# Patient Record
Sex: FEMALE | Race: WHITE | Employment: OTHER | URBAN - METROPOLITAN AREA
[De-identification: names, ages, dates, MRNs, and addresses within clinical notes are randomized per-mention and may not be internally consistent; named-entity substitution may affect disease eponyms.]

---

## 2017-02-16 ENCOUNTER — HOSPITAL ENCOUNTER (OUTPATIENT)
Age: 72
Setting detail: SPECIMEN
Discharge: HOME | End: 2017-02-16
Payer: MEDICARE

## 2017-02-16 LAB
ABSOLUTE EOS #: 0 K/UL (ref 0–0.4)
ABSOLUTE LYMPH #: 2.8 K/UL (ref 1–4.8)
ABSOLUTE MONO #: 0.6 K/UL (ref 0.1–1.2)
BASOPHILS # BLD: 1 % (ref 0–2)
BASOPHILS ABSOLUTE: 0 K/UL (ref 0–0.2)
DIFFERENTIAL TYPE: NORMAL
EOSINOPHILS RELATIVE PERCENT: 1 % (ref 1–4)
HCT VFR BLD CALC: 44.7 % (ref 36–46)
HEMOGLOBIN: 15.4 G/DL (ref 12–16)
LYMPHOCYTES # BLD: 42 % (ref 24–44)
MCH RBC QN AUTO: 29.8 PG (ref 26–34)
MCHC RBC AUTO-ENTMCNC: 34.5 G/DL (ref 31–37)
MCV RBC AUTO: 86.4 FL (ref 80–100)
MONOCYTES # BLD: 9 % (ref 2–11)
PDW BLD-RTO: 12.5 % (ref 12.5–15.4)
PLATELET # BLD: 261 K/UL (ref 140–450)
PLATELET ESTIMATE: NORMAL
PMV BLD AUTO: 9.4 FL (ref 6–12)
RBC # BLD: 5.18 M/UL (ref 4–5.2)
RBC # BLD: NORMAL 10*6/UL
SEG NEUTROPHILS: 47 % (ref 36–66)
SEGMENTED NEUTROPHILS ABSOLUTE COUNT: 3.1 K/UL (ref 1.8–7.7)
T. PALLIDUM, IGG: NONREACTIVE
T3 FREE: 2.81 PG/ML (ref 2.02–4.43)
THYROXINE, FREE: 1.32 NG/DL (ref 0.93–1.7)
TSH SERPL DL<=0.05 MIU/L-ACNC: 2.51 MIU/L (ref 0.3–5)
VITAMIN B-12: 315 PG/ML (ref 211–946)
WBC # BLD: 6.6 K/UL (ref 3.5–11)
WBC # BLD: NORMAL 10*3/UL

## 2017-02-17 LAB
ALBUMIN (CALCULATED): 4.4 G/DL (ref 3.2–5.2)
ALBUMIN PERCENT: 67 % (ref 45–65)
ALPHA 1 PERCENT: 3 % (ref 3–6)
ALPHA 2 PERCENT: 11 % (ref 6–13)
ALPHA-1-GLOBULIN: 0.2 G/DL (ref 0.1–0.4)
ALPHA-2-GLOBULIN: 0.7 G/DL (ref 0.5–0.9)
BETA GLOBULIN: 0.7 G/DL (ref 0.7–1.4)
BETA PERCENT: 11 % (ref 11–19)
CRYPTOCOCCAL ANTIGEN: NEGATIVE
GAMMA GLOBULIN %: 8 % (ref 9–20)
GAMMA GLOBULIN: 0.5 G/DL (ref 0.5–1.5)
PATHOLOGIST: ABNORMAL
PROTEIN ELECTROPHORESIS, SERUM: ABNORMAL
TOTAL PROT. SUM,%: 100 % (ref 98–102)
TOTAL PROT. SUM: 6.5 G/DL (ref 6.3–8.2)
TOTAL PROTEIN: 6.6 G/DL (ref 6.4–8.3)

## 2017-02-18 LAB — VITAMIN D 1,25-DIHYDROXY: 58.8 PG/ML (ref 19.9–79.3)

## 2017-02-19 LAB — METHYLMALONIC ACID: 0.14 UMOL/L (ref 0–0.4)

## 2017-05-15 ENCOUNTER — HOSPITAL ENCOUNTER (OUTPATIENT)
Age: 72
Discharge: HOME | End: 2017-05-15
Payer: MEDICARE

## 2017-05-15 PROCEDURE — 81401 MOPATH PROCEDURE LEVEL 2: CPT

## 2017-05-15 PROCEDURE — 36415 COLL VENOUS BLD VENIPUNCTURE: CPT

## 2017-06-08 LAB
SEND OUT REPORT: NORMAL
TEST NAME: NORMAL

## 2017-06-19 ENCOUNTER — APPOINTMENT (OUTPATIENT)
Dept: CT IMAGING | Age: 72
End: 2017-06-19
Payer: MEDICARE

## 2017-06-19 ENCOUNTER — HOSPITAL ENCOUNTER (EMERGENCY)
Age: 72
Discharge: HOME | End: 2017-06-19
Attending: EMERGENCY MEDICINE
Payer: MEDICARE

## 2017-06-19 VITALS
HEIGHT: 65 IN | HEART RATE: 81 BPM | OXYGEN SATURATION: 99 % | RESPIRATION RATE: 13 BRPM | BODY MASS INDEX: 22.99 KG/M2 | DIASTOLIC BLOOD PRESSURE: 76 MMHG | WEIGHT: 138 LBS | SYSTOLIC BLOOD PRESSURE: 146 MMHG | TEMPERATURE: 97.1 F

## 2017-06-19 DIAGNOSIS — R25.2 JERKING MOVEMENTS OF EXTREMITIES: Primary | ICD-10-CM

## 2017-06-19 LAB
ABSOLUTE EOS #: 0 K/UL (ref 0–0.4)
ABSOLUTE LYMPH #: 1.5 K/UL (ref 1–4.8)
ABSOLUTE MONO #: 0.5 K/UL (ref 0.1–1.2)
ACETAMINOPHEN LEVEL: <10 UG/ML (ref 10–30)
ALBUMIN SERPL-MCNC: 4 G/DL (ref 3.5–5.2)
ALBUMIN/GLOBULIN RATIO: 1.6 (ref 1–2.5)
ALP BLD-CCNC: 48 U/L (ref 35–104)
ALT SERPL-CCNC: 11 U/L (ref 5–33)
ANION GAP SERPL CALCULATED.3IONS-SCNC: 15 MMOL/L (ref 9–17)
AST SERPL-CCNC: 19 U/L
BASOPHILS # BLD: 1 %
BASOPHILS ABSOLUTE: 0.1 K/UL (ref 0–0.2)
BILIRUB SERPL-MCNC: 0.58 MG/DL (ref 0.3–1.2)
BILIRUBIN DIRECT: 0.11 MG/DL
BILIRUBIN, INDIRECT: 0.47 MG/DL (ref 0–1)
BUN BLDV-MCNC: 13 MG/DL (ref 8–23)
BUN/CREAT BLD: ABNORMAL (ref 9–20)
C-REACTIVE PROTEIN: <0.3 MG/L (ref 0–5)
CALCIUM SERPL-MCNC: 9.2 MG/DL (ref 8.6–10.4)
CHLORIDE BLD-SCNC: 105 MMOL/L (ref 98–107)
CO2: 20 MMOL/L (ref 20–31)
CREAT SERPL-MCNC: 0.59 MG/DL (ref 0.5–0.9)
DIFFERENTIAL TYPE: NORMAL
EOSINOPHILS RELATIVE PERCENT: 0 %
ETHANOL PERCENT: <0.01 %
ETHANOL: <10 MG/DL
GFR AFRICAN AMERICAN: >60 ML/MIN
GFR NON-AFRICAN AMERICAN: >60 ML/MIN
GFR SERPL CREATININE-BSD FRML MDRD: ABNORMAL ML/MIN/{1.73_M2}
GFR SERPL CREATININE-BSD FRML MDRD: ABNORMAL ML/MIN/{1.73_M2}
GLOBULIN: NORMAL G/DL (ref 1.5–3.8)
GLUCOSE BLD-MCNC: 123 MG/DL (ref 70–99)
HCT VFR BLD CALC: 41 % (ref 36–46)
HEMOGLOBIN: 14.6 G/DL (ref 12–16)
LYMPHOCYTES # BLD: 29 %
MCH RBC QN AUTO: 30.4 PG (ref 26–34)
MCHC RBC AUTO-ENTMCNC: 35.5 G/DL (ref 31–37)
MCV RBC AUTO: 85.7 FL (ref 80–100)
MONOCYTES # BLD: 10 %
PDW BLD-RTO: 12.6 % (ref 12.5–15.4)
PLATELET # BLD: 230 K/UL (ref 140–450)
PLATELET ESTIMATE: NORMAL
PMV BLD AUTO: 8.5 FL (ref 6–12)
POTASSIUM SERPL-SCNC: 4 MMOL/L (ref 3.7–5.3)
RBC # BLD: 4.79 M/UL (ref 4–5.2)
RBC # BLD: NORMAL 10*6/UL
SALICYLATE LEVEL: <1 MG/DL (ref 3–10)
SEG NEUTROPHILS: 60 %
SEGMENTED NEUTROPHILS ABSOLUTE COUNT: 3.1 K/UL (ref 1.8–7.7)
SODIUM BLD-SCNC: 140 MMOL/L (ref 135–144)
TOTAL PROTEIN: 6.5 G/DL (ref 6.4–8.3)
TOXIC TRICYCLIC SC,BLOOD: NEGATIVE
WBC # BLD: 5.1 K/UL (ref 3.5–11)
WBC # BLD: NORMAL 10*3/UL

## 2017-06-19 PROCEDURE — 2580000003 HC RX 258: Performed by: EMERGENCY MEDICINE

## 2017-06-19 PROCEDURE — 86140 C-REACTIVE PROTEIN: CPT

## 2017-06-19 PROCEDURE — 80307 DRUG TEST PRSMV CHEM ANLYZR: CPT

## 2017-06-19 PROCEDURE — 85025 COMPLETE CBC W/AUTO DIFF WBC: CPT

## 2017-06-19 PROCEDURE — 70450 CT HEAD/BRAIN W/O DYE: CPT

## 2017-06-19 PROCEDURE — G0480 DRUG TEST DEF 1-7 CLASSES: HCPCS

## 2017-06-19 PROCEDURE — 80076 HEPATIC FUNCTION PANEL: CPT

## 2017-06-19 PROCEDURE — 99284 EMERGENCY DEPT VISIT MOD MDM: CPT

## 2017-06-19 PROCEDURE — 80048 BASIC METABOLIC PNL TOTAL CA: CPT

## 2017-06-19 RX ORDER — 0.9 % SODIUM CHLORIDE 0.9 %
1000 INTRAVENOUS SOLUTION INTRAVENOUS ONCE
Status: COMPLETED | OUTPATIENT
Start: 2017-06-19 | End: 2017-06-19

## 2017-06-19 RX ADMIN — SODIUM CHLORIDE 1000 ML: 9 INJECTION, SOLUTION INTRAVENOUS at 07:20

## 2017-06-19 ASSESSMENT — ENCOUNTER SYMPTOMS
DIARRHEA: 0
VOMITING: 0
CHEST TIGHTNESS: 0
NAUSEA: 0

## 2017-06-26 ENCOUNTER — TELEPHONE (OUTPATIENT)
Dept: NEUROLOGY | Age: 72
End: 2017-06-26

## 2017-06-27 ENCOUNTER — TELEPHONE (OUTPATIENT)
Dept: NEUROLOGY | Age: 72
End: 2017-06-27

## 2019-03-07 ENCOUNTER — HOSPITAL ENCOUNTER (OUTPATIENT)
Age: 74
Setting detail: OBSERVATION
Discharge: HOME | End: 2019-03-08
Attending: EMERGENCY MEDICINE | Admitting: EMERGENCY MEDICINE
Payer: COMMERCIAL

## 2019-03-07 ENCOUNTER — APPOINTMENT (OUTPATIENT)
Dept: GENERAL RADIOLOGY | Age: 74
End: 2019-03-07
Payer: COMMERCIAL

## 2019-03-07 DIAGNOSIS — R07.9 CHEST PAIN, UNSPECIFIED TYPE: Primary | ICD-10-CM

## 2019-03-07 LAB
ABSOLUTE EOS #: <0.03 K/UL (ref 0–0.44)
ABSOLUTE IMMATURE GRANULOCYTE: <0.03 K/UL (ref 0–0.3)
ABSOLUTE LYMPH #: 2 K/UL (ref 1.1–3.7)
ABSOLUTE MONO #: 0.61 K/UL (ref 0.1–1.2)
ANION GAP SERPL CALCULATED.3IONS-SCNC: 12 MMOL/L (ref 9–17)
BASOPHILS # BLD: 1 % (ref 0–2)
BASOPHILS ABSOLUTE: 0.03 K/UL (ref 0–0.2)
BUN BLDV-MCNC: 16 MG/DL (ref 8–23)
BUN/CREAT BLD: ABNORMAL (ref 9–20)
CALCIUM SERPL-MCNC: 9.5 MG/DL (ref 8.6–10.4)
CHLORIDE BLD-SCNC: 108 MMOL/L (ref 98–107)
CO2: 21 MMOL/L (ref 20–31)
CREAT SERPL-MCNC: 0.58 MG/DL (ref 0.5–0.9)
DIFFERENTIAL TYPE: ABNORMAL
EOSINOPHILS RELATIVE PERCENT: 0 % (ref 1–4)
GFR AFRICAN AMERICAN: >60 ML/MIN
GFR NON-AFRICAN AMERICAN: >60 ML/MIN
GFR SERPL CREATININE-BSD FRML MDRD: ABNORMAL ML/MIN/{1.73_M2}
GFR SERPL CREATININE-BSD FRML MDRD: ABNORMAL ML/MIN/{1.73_M2}
GLUCOSE BLD-MCNC: 100 MG/DL (ref 70–99)
HCT VFR BLD CALC: 43.6 % (ref 36.3–47.1)
HEMOGLOBIN: 14.8 G/DL (ref 11.9–15.1)
IMMATURE GRANULOCYTES: 0 %
LYMPHOCYTES # BLD: 35 % (ref 24–43)
MCH RBC QN AUTO: 31 PG (ref 25.2–33.5)
MCHC RBC AUTO-ENTMCNC: 33.9 G/DL (ref 28.4–34.8)
MCV RBC AUTO: 91.4 FL (ref 82.6–102.9)
MONOCYTES # BLD: 11 % (ref 3–12)
NRBC AUTOMATED: 0 PER 100 WBC
PDW BLD-RTO: 11.8 % (ref 11.8–14.4)
PLATELET # BLD: 273 K/UL (ref 138–453)
PLATELET ESTIMATE: ABNORMAL
PMV BLD AUTO: 10.3 FL (ref 8.1–13.5)
POTASSIUM SERPL-SCNC: 3.6 MMOL/L (ref 3.7–5.3)
RBC # BLD: 4.77 M/UL (ref 3.95–5.11)
RBC # BLD: ABNORMAL 10*6/UL
SEG NEUTROPHILS: 53 % (ref 36–65)
SEGMENTED NEUTROPHILS ABSOLUTE COUNT: 3.11 K/UL (ref 1.5–8.1)
SODIUM BLD-SCNC: 141 MMOL/L (ref 135–144)
TROPONIN INTERP: NORMAL
TROPONIN T: NORMAL NG/ML
TROPONIN, HIGH SENSITIVITY: <6 NG/L (ref 0–14)
WBC # BLD: 5.8 K/UL (ref 3.5–11.3)
WBC # BLD: ABNORMAL 10*3/UL

## 2019-03-07 PROCEDURE — 93005 ELECTROCARDIOGRAM TRACING: CPT

## 2019-03-07 PROCEDURE — 2580000003 HC RX 258: Performed by: EMERGENCY MEDICINE

## 2019-03-07 PROCEDURE — 99285 EMERGENCY DEPT VISIT HI MDM: CPT

## 2019-03-07 PROCEDURE — 71046 X-RAY EXAM CHEST 2 VIEWS: CPT

## 2019-03-07 PROCEDURE — 85025 COMPLETE CBC W/AUTO DIFF WBC: CPT

## 2019-03-07 PROCEDURE — G0378 HOSPITAL OBSERVATION PER HR: HCPCS

## 2019-03-07 PROCEDURE — 6370000000 HC RX 637 (ALT 250 FOR IP): Performed by: EMERGENCY MEDICINE

## 2019-03-07 PROCEDURE — 80048 BASIC METABOLIC PNL TOTAL CA: CPT

## 2019-03-07 PROCEDURE — 84484 ASSAY OF TROPONIN QUANT: CPT

## 2019-03-07 RX ORDER — RIVASTIGMINE 9.5 MG/24H
1 PATCH, EXTENDED RELEASE TRANSDERMAL DAILY
Status: DISCONTINUED | OUTPATIENT
Start: 2019-03-07 | End: 2019-03-08 | Stop reason: HOSPADM

## 2019-03-07 RX ORDER — SODIUM CHLORIDE 0.9 % (FLUSH) 0.9 %
10 SYRINGE (ML) INJECTION EVERY 12 HOURS SCHEDULED
Status: DISCONTINUED | OUTPATIENT
Start: 2019-03-07 | End: 2019-03-08 | Stop reason: HOSPADM

## 2019-03-07 RX ORDER — SODIUM CHLORIDE 0.9 % (FLUSH) 0.9 %
10 SYRINGE (ML) INJECTION PRN
Status: DISCONTINUED | OUTPATIENT
Start: 2019-03-07 | End: 2019-03-08 | Stop reason: HOSPADM

## 2019-03-07 RX ORDER — OXCARBAZEPINE 150 MG/1
150 TABLET, FILM COATED ORAL 2 TIMES DAILY
Status: ON HOLD | COMMUNITY
End: 2019-05-02 | Stop reason: ALTCHOICE

## 2019-03-07 RX ORDER — OXCARBAZEPINE 150 MG/1
150 TABLET, FILM COATED ORAL 2 TIMES DAILY
Status: DISCONTINUED | OUTPATIENT
Start: 2019-03-07 | End: 2019-03-08 | Stop reason: HOSPADM

## 2019-03-07 RX ORDER — ACETAMINOPHEN 325 MG/1
650 TABLET ORAL EVERY 4 HOURS PRN
Status: DISCONTINUED | OUTPATIENT
Start: 2019-03-07 | End: 2019-03-08 | Stop reason: HOSPADM

## 2019-03-07 RX ORDER — RIVASTIGMINE 9.5 MG/24H
1 PATCH, EXTENDED RELEASE TRANSDERMAL DAILY
COMMUNITY

## 2019-03-07 RX ADMIN — OXCARBAZEPINE 150 MG: 150 TABLET, FILM COATED ORAL at 22:28

## 2019-03-07 RX ADMIN — SODIUM CHLORIDE, PRESERVATIVE FREE 10 ML: 5 INJECTION INTRAVENOUS at 22:18

## 2019-03-07 ASSESSMENT — HEART SCORE: ECG: 1

## 2019-03-07 ASSESSMENT — PAIN SCALES - GENERAL: PAINLEVEL_OUTOF10: 0

## 2019-03-08 ENCOUNTER — APPOINTMENT (OUTPATIENT)
Dept: NUCLEAR MEDICINE | Age: 74
End: 2019-03-08
Payer: COMMERCIAL

## 2019-03-08 VITALS
TEMPERATURE: 98.3 F | BODY MASS INDEX: 16.74 KG/M2 | OXYGEN SATURATION: 98 % | DIASTOLIC BLOOD PRESSURE: 82 MMHG | WEIGHT: 113 LBS | HEIGHT: 69 IN | RESPIRATION RATE: 16 BRPM | HEART RATE: 69 BPM | SYSTOLIC BLOOD PRESSURE: 146 MMHG

## 2019-03-08 LAB
EKG ATRIAL RATE: 69 BPM
EKG ATRIAL RATE: 82 BPM
EKG P AXIS: 74 DEGREES
EKG P AXIS: 78 DEGREES
EKG P-R INTERVAL: 142 MS
EKG P-R INTERVAL: 144 MS
EKG Q-T INTERVAL: 352 MS
EKG Q-T INTERVAL: 402 MS
EKG QRS DURATION: 60 MS
EKG QRS DURATION: 66 MS
EKG QTC CALCULATION (BAZETT): 411 MS
EKG QTC CALCULATION (BAZETT): 430 MS
EKG R AXIS: 64 DEGREES
EKG R AXIS: 68 DEGREES
EKG T AXIS: 64 DEGREES
EKG T AXIS: 67 DEGREES
EKG VENTRICULAR RATE: 69 BPM
EKG VENTRICULAR RATE: 82 BPM
LV EF: 70 %
LVEF MODALITY: NORMAL
TROPONIN INTERP: NORMAL
TROPONIN T: NORMAL NG/ML
TROPONIN, HIGH SENSITIVITY: <6 NG/L (ref 0–14)

## 2019-03-08 PROCEDURE — G0378 HOSPITAL OBSERVATION PER HR: HCPCS

## 2019-03-08 PROCEDURE — 84484 ASSAY OF TROPONIN QUANT: CPT

## 2019-03-08 PROCEDURE — 78452 HT MUSCLE IMAGE SPECT MULT: CPT

## 2019-03-08 PROCEDURE — A9500 TC99M SESTAMIBI: HCPCS | Performed by: INTERNAL MEDICINE

## 2019-03-08 PROCEDURE — 36415 COLL VENOUS BLD VENIPUNCTURE: CPT

## 2019-03-08 PROCEDURE — 6360000002 HC RX W HCPCS: Performed by: INTERNAL MEDICINE

## 2019-03-08 PROCEDURE — 3430000000 HC RX DIAGNOSTIC RADIOPHARMACEUTICAL: Performed by: INTERNAL MEDICINE

## 2019-03-08 PROCEDURE — 93005 ELECTROCARDIOGRAM TRACING: CPT

## 2019-03-08 PROCEDURE — 2580000003 HC RX 258: Performed by: INTERNAL MEDICINE

## 2019-03-08 PROCEDURE — 6370000000 HC RX 637 (ALT 250 FOR IP): Performed by: EMERGENCY MEDICINE

## 2019-03-08 PROCEDURE — 2580000003 HC RX 258: Performed by: EMERGENCY MEDICINE

## 2019-03-08 PROCEDURE — 93017 CV STRESS TEST TRACING ONLY: CPT

## 2019-03-08 RX ORDER — AMINOPHYLLINE DIHYDRATE 25 MG/ML
100 INJECTION, SOLUTION INTRAVENOUS
Status: DISCONTINUED | OUTPATIENT
Start: 2019-03-08 | End: 2019-03-08

## 2019-03-08 RX ORDER — NITROGLYCERIN 0.4 MG/1
0.4 TABLET SUBLINGUAL EVERY 5 MIN PRN
Status: DISCONTINUED | OUTPATIENT
Start: 2019-03-08 | End: 2019-03-08 | Stop reason: ALTCHOICE

## 2019-03-08 RX ORDER — SODIUM CHLORIDE 9 MG/ML
INJECTION, SOLUTION INTRAVENOUS ONCE
Status: DISCONTINUED | OUTPATIENT
Start: 2019-03-08 | End: 2019-03-08 | Stop reason: ALTCHOICE

## 2019-03-08 RX ORDER — SODIUM CHLORIDE 0.9 % (FLUSH) 0.9 %
10 SYRINGE (ML) INJECTION PRN
Status: DISCONTINUED | OUTPATIENT
Start: 2019-03-08 | End: 2019-03-08 | Stop reason: HOSPADM

## 2019-03-08 RX ORDER — SODIUM CHLORIDE 0.9 % (FLUSH) 0.9 %
10 SYRINGE (ML) INJECTION PRN
Status: DISCONTINUED | OUTPATIENT
Start: 2019-03-08 | End: 2019-03-08 | Stop reason: ALTCHOICE

## 2019-03-08 RX ORDER — METOPROLOL TARTRATE 5 MG/5ML
2.5 INJECTION INTRAVENOUS PRN
Status: DISCONTINUED | OUTPATIENT
Start: 2019-03-08 | End: 2019-03-08 | Stop reason: ALTCHOICE

## 2019-03-08 RX ADMIN — REGADENOSON 0.4 MG: 0.08 INJECTION, SOLUTION INTRAVENOUS at 11:34

## 2019-03-08 RX ADMIN — TETRAKIS(2-METHOXYISOBUTYLISOCYANIDE)COPPER(I) TETRAFLUOROBORATE 10.5 MILLICURIE: 1 INJECTION, POWDER, LYOPHILIZED, FOR SOLUTION INTRAVENOUS at 11:35

## 2019-03-08 RX ADMIN — Medication 10 ML: at 11:34

## 2019-03-08 RX ADMIN — ACETAMINOPHEN 650 MG: 325 TABLET ORAL at 09:29

## 2019-03-08 RX ADMIN — SODIUM CHLORIDE, PRESERVATIVE FREE 10 ML: 5 INJECTION INTRAVENOUS at 11:35

## 2019-03-08 RX ADMIN — TETRAKIS(2-METHOXYISOBUTYLISOCYANIDE)COPPER(I) TETRAFLUOROBORATE 39 MILLICURIE: 1 INJECTION, POWDER, LYOPHILIZED, FOR SOLUTION INTRAVENOUS at 12:50

## 2019-03-08 RX ADMIN — OXCARBAZEPINE 150 MG: 150 TABLET, FILM COATED ORAL at 15:15

## 2019-03-08 RX ADMIN — Medication 10 ML: at 11:24

## 2019-03-08 RX ADMIN — SODIUM CHLORIDE, PRESERVATIVE FREE 10 ML: 5 INJECTION INTRAVENOUS at 15:23

## 2019-03-08 RX ADMIN — SODIUM CHLORIDE: 9 INJECTION, SOLUTION INTRAVENOUS at 10:45

## 2019-03-08 RX ADMIN — SODIUM CHLORIDE, PRESERVATIVE FREE 10 ML: 5 INJECTION INTRAVENOUS at 12:50

## 2019-03-08 ASSESSMENT — PAIN SCALES - GENERAL: PAINLEVEL_OUTOF10: 3

## 2019-05-01 ENCOUNTER — APPOINTMENT (OUTPATIENT)
Dept: CT IMAGING | Age: 74
DRG: 312 | End: 2019-05-01
Payer: COMMERCIAL

## 2019-05-01 ENCOUNTER — APPOINTMENT (OUTPATIENT)
Dept: GENERAL RADIOLOGY | Age: 74
DRG: 312 | End: 2019-05-01
Payer: COMMERCIAL

## 2019-05-01 ENCOUNTER — HOSPITAL ENCOUNTER (INPATIENT)
Age: 74
LOS: 2 days | Discharge: NURSING, REHAB OR LONG-TERM CARE | DRG: 312 | End: 2019-05-03
Attending: EMERGENCY MEDICINE | Admitting: INTERNAL MEDICINE
Payer: COMMERCIAL

## 2019-05-01 DIAGNOSIS — R55 SYNCOPE AND COLLAPSE: Primary | ICD-10-CM

## 2019-05-01 DIAGNOSIS — R56.9 SEIZURE-LIKE ACTIVITY (HCC): ICD-10-CM
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
## 2019-05-01 LAB
ABO/RH: NORMAL
ACT TEG: 105 SEC (ref 86–118)
ALLEN TEST: ABNORMAL
ANGLE, RAPID TEG: 73.6 DEG (ref 64–80)
ANION GAP SERPL CALCULATED.3IONS-SCNC: 11 MMOL/L (ref 9–17)
ANTIBODY SCREEN: NEGATIVE
ARM BAND NUMBER: NORMAL
BLOOD BANK SPECIMEN: ABNORMAL
BUN BLDV-MCNC: 28 MG/DL (ref 8–23)
CARBOXYHEMOGLOBIN: 0.6 % (ref 0–5)
CHLORIDE BLD-SCNC: 109 MMOL/L (ref 98–107)
CO2: 22 MMOL/L (ref 20–31)
CREAT SERPL-MCNC: 0.72 MG/DL (ref 0.5–0.9)
EPL-TEG: 3.1 % (ref 0–15)
ETHANOL PERCENT: <0.01 %
ETHANOL: <10 MG/DL
EXPIRATION DATE: NORMAL
FIO2: ABNORMAL
GFR AFRICAN AMERICAN: >60 ML/MIN
GFR NON-AFRICAN AMERICAN: >60 ML/MIN
GFR SERPL CREATININE-BSD FRML MDRD: ABNORMAL ML/MIN/{1.73_M2}
GFR SERPL CREATININE-BSD FRML MDRD: ABNORMAL ML/MIN/{1.73_M2}
GLUCOSE BLD-MCNC: 121 MG/DL (ref 70–99)
HCG QUALITATIVE: ABNORMAL
HCO3 VENOUS: 25.6 MMOL/L (ref 24–30)
HCT VFR BLD CALC: 42.5 % (ref 36.3–47.1)
HEMOGLOBIN: 13.9 G/DL (ref 11.9–15.1)
HEPARIN THERAPY: NORMAL
INR BLD: 1
KINETICS RAPID TEG: 1.2 MIN (ref 1–2)
LY30 (LYSIS) TEG: 3.1 % (ref 0–8)
MA(MAX CLOT) RAPID TEG: 61.5 MM (ref 52–71)
MCH RBC QN AUTO: 29.8 PG (ref 25.2–33.5)
MCHC RBC AUTO-ENTMCNC: 32.7 G/DL (ref 28.4–34.8)
MCV RBC AUTO: 91 FL (ref 82.6–102.9)
METHEMOGLOBIN: ABNORMAL % (ref 0–1.5)
MODE: ABNORMAL
MYOGLOBIN: 40 NG/ML (ref 25–58)
NEGATIVE BASE EXCESS, VEN: ABNORMAL MMOL/L (ref 0–2)
NOTIFICATION TIME: ABNORMAL
NOTIFICATION: ABNORMAL
NRBC AUTOMATED: 0 PER 100 WBC
O2 DEVICE/FLOW/%: ABNORMAL
O2 SAT, VEN: 37.5 % (ref 60–85)
OXYHEMOGLOBIN: ABNORMAL % (ref 95–98)
PARTIAL THROMBOPLASTIN TIME: 21.7 SEC (ref 20.5–30.5)
PATIENT TEMP: 37
PCO2, VEN, TEMP ADJ: ABNORMAL MMHG (ref 39–55)
PCO2, VEN: 38.8 (ref 39–55)
PDW BLD-RTO: 12.1 % (ref 11.8–14.4)
PEEP/CPAP: ABNORMAL
PH VENOUS: 7.43 (ref 7.32–7.42)
PH, VEN, TEMP ADJ: ABNORMAL (ref 7.32–7.42)
PLATELET # BLD: 261 K/UL (ref 138–453)
PMV BLD AUTO: 9.4 FL (ref 8.1–13.5)
PO2, VEN, TEMP ADJ: ABNORMAL MMHG (ref 30–50)
PO2, VEN: 21.2 (ref 30–50)
POSITIVE BASE EXCESS, VEN: 1.8 MMOL/L (ref 0–2)
POTASSIUM SERPL-SCNC: 4.5 MMOL/L (ref 3.7–5.3)
PROLACTIN: 41.3 UG/L (ref 4.79–23.3)
PROTHROMBIN TIME: 10.7 SEC (ref 9–12)
PSV: ABNORMAL
PT. POSITION: ABNORMAL
RBC # BLD: 4.67 M/UL (ref 3.95–5.11)
REACTION TIME RAPID TEG: 0.6 MIN (ref 0–1)
RESPIRATORY RATE: ABNORMAL
SAMPLE SITE: ABNORMAL
SET RATE: ABNORMAL
SODIUM BLD-SCNC: 142 MMOL/L (ref 135–144)
TEG COMMENT: NORMAL
TEXT FOR RESPIRATORY: ABNORMAL
TOTAL HB: ABNORMAL G/DL (ref 12–16)
TOTAL RATE: ABNORMAL
TROPONIN INTERP: NORMAL
TROPONIN T: NORMAL NG/ML
TROPONIN, HIGH SENSITIVITY: 7 NG/L (ref 0–14)
VT: ABNORMAL
WBC # BLD: 5.7 K/UL (ref 3.5–11.3)

## 2019-05-01 PROCEDURE — 85210 CLOT FACTOR II PROTHROM SPEC: CPT

## 2019-05-01 PROCEDURE — 71045 X-RAY EXAM CHEST 1 VIEW: CPT

## 2019-05-01 PROCEDURE — 84520 ASSAY OF UREA NITROGEN: CPT

## 2019-05-01 PROCEDURE — 80051 ELECTROLYTE PANEL: CPT

## 2019-05-01 PROCEDURE — 1200000000 HC SEMI PRIVATE

## 2019-05-01 PROCEDURE — 86901 BLOOD TYPING SEROLOGIC RH(D): CPT

## 2019-05-01 PROCEDURE — 6370000000 HC RX 637 (ALT 250 FOR IP): Performed by: STUDENT IN AN ORGANIZED HEALTH CARE EDUCATION/TRAINING PROGRAM

## 2019-05-01 PROCEDURE — 82947 ASSAY GLUCOSE BLOOD QUANT: CPT

## 2019-05-01 PROCEDURE — 85390 FIBRINOLYSINS SCREEN I&R: CPT

## 2019-05-01 PROCEDURE — 99285 EMERGENCY DEPT VISIT HI MDM: CPT

## 2019-05-01 PROCEDURE — 86850 RBC ANTIBODY SCREEN: CPT

## 2019-05-01 PROCEDURE — 85730 THROMBOPLASTIN TIME PARTIAL: CPT

## 2019-05-01 PROCEDURE — 80183 DRUG SCRN QUANT OXCARBAZEPIN: CPT

## 2019-05-01 PROCEDURE — 93005 ELECTROCARDIOGRAM TRACING: CPT

## 2019-05-01 PROCEDURE — 84146 ASSAY OF PROLACTIN: CPT

## 2019-05-01 PROCEDURE — 82805 BLOOD GASES W/O2 SATURATION: CPT

## 2019-05-01 PROCEDURE — 85027 COMPLETE CBC AUTOMATED: CPT

## 2019-05-01 PROCEDURE — 82565 ASSAY OF CREATININE: CPT

## 2019-05-01 PROCEDURE — 84703 CHORIONIC GONADOTROPIN ASSAY: CPT

## 2019-05-01 PROCEDURE — 83874 ASSAY OF MYOGLOBIN: CPT

## 2019-05-01 PROCEDURE — 70450 CT HEAD/BRAIN W/O DYE: CPT

## 2019-05-01 PROCEDURE — 84484 ASSAY OF TROPONIN QUANT: CPT

## 2019-05-01 PROCEDURE — G0480 DRUG TEST DEF 1-7 CLASSES: HCPCS

## 2019-05-01 PROCEDURE — 86900 BLOOD TYPING SEROLOGIC ABO: CPT

## 2019-05-01 PROCEDURE — 72125 CT NECK SPINE W/O DYE: CPT

## 2019-05-01 PROCEDURE — 2580000003 HC RX 258: Performed by: STUDENT IN AN ORGANIZED HEALTH CARE EDUCATION/TRAINING PROGRAM

## 2019-05-01 PROCEDURE — 85610 PROTHROMBIN TIME: CPT

## 2019-05-01 RX ORDER — POTASSIUM CHLORIDE 7.45 MG/ML
10 INJECTION INTRAVENOUS PRN
Status: DISCONTINUED | OUTPATIENT
Start: 2019-05-01 | End: 2019-05-03 | Stop reason: HOSPADM

## 2019-05-01 RX ORDER — ACETAMINOPHEN 325 MG/1
650 TABLET ORAL EVERY 4 HOURS PRN
Status: DISCONTINUED | OUTPATIENT
Start: 2019-05-01 | End: 2019-05-03 | Stop reason: HOSPADM

## 2019-05-01 RX ORDER — SODIUM CHLORIDE 0.9 % (FLUSH) 0.9 %
10 SYRINGE (ML) INJECTION PRN
Status: DISCONTINUED | OUTPATIENT
Start: 2019-05-01 | End: 2019-05-03 | Stop reason: HOSPADM

## 2019-05-01 RX ORDER — RIVASTIGMINE 9.5 MG/24H
1 PATCH, EXTENDED RELEASE TRANSDERMAL DAILY
Status: DISCONTINUED | OUTPATIENT
Start: 2019-05-02 | End: 2019-05-03 | Stop reason: HOSPADM

## 2019-05-01 RX ORDER — OXCARBAZEPINE 150 MG/1
150 TABLET, FILM COATED ORAL 2 TIMES DAILY
Status: DISCONTINUED | OUTPATIENT
Start: 2019-05-01 | End: 2019-05-03 | Stop reason: HOSPADM

## 2019-05-01 RX ORDER — ONDANSETRON 2 MG/ML
4 INJECTION INTRAMUSCULAR; INTRAVENOUS EVERY 6 HOURS PRN
Status: DISCONTINUED | OUTPATIENT
Start: 2019-05-01 | End: 2019-05-03 | Stop reason: HOSPADM

## 2019-05-01 RX ORDER — POTASSIUM CHLORIDE 20 MEQ/1
40 TABLET, EXTENDED RELEASE ORAL PRN
Status: DISCONTINUED | OUTPATIENT
Start: 2019-05-01 | End: 2019-05-03 | Stop reason: HOSPADM

## 2019-05-01 RX ORDER — ACETAMINOPHEN 325 MG/1
650 TABLET ORAL EVERY 4 HOURS PRN
Status: DISCONTINUED | OUTPATIENT
Start: 2019-05-01 | End: 2019-05-01

## 2019-05-01 RX ORDER — SODIUM CHLORIDE 0.9 % (FLUSH) 0.9 %
10 SYRINGE (ML) INJECTION PRN
Status: DISCONTINUED | OUTPATIENT
Start: 2019-05-01 | End: 2019-05-01

## 2019-05-01 RX ORDER — MAGNESIUM SULFATE 1 G/100ML
1 INJECTION INTRAVENOUS PRN
Status: DISCONTINUED | OUTPATIENT
Start: 2019-05-01 | End: 2019-05-03 | Stop reason: HOSPADM

## 2019-05-01 RX ORDER — SODIUM CHLORIDE 0.9 % (FLUSH) 0.9 %
10 SYRINGE (ML) INJECTION EVERY 12 HOURS SCHEDULED
Status: DISCONTINUED | OUTPATIENT
Start: 2019-05-01 | End: 2019-05-01

## 2019-05-01 RX ORDER — SODIUM CHLORIDE 0.9 % (FLUSH) 0.9 %
10 SYRINGE (ML) INJECTION EVERY 12 HOURS SCHEDULED
Status: DISCONTINUED | OUTPATIENT
Start: 2019-05-01 | End: 2019-05-03 | Stop reason: HOSPADM

## 2019-05-01 RX ADMIN — OXCARBAZEPINE 150 MG: 150 TABLET, FILM COATED ORAL at 23:52

## 2019-05-01 RX ADMIN — Medication 10 ML: at 23:53

## 2019-05-01 NOTE — ED PROVIDER NOTES
[facility] [address] [facility]     Emergency Department     Faculty Attestation    I performed a history and physical examination of the patient and discussed management with the resident. I reviewed the residents note and agree with the documented findings and plan of care. Any areas of disagreement are noted on the chart. I was personally present for the key portions of any procedures. I have documented in the chart those procedures where I was not present during the key portions. I have reviewed the emergency nurses triage note. I agree with the chief complaint, past medical history, past surgical history, allergies, medications, social and family history as documented unless otherwise noted below. For Physician Assistant/ Nurse Practitioner cases/documentation I have personally evaluated this patient and have completed at least one if not all key elements of the E/M (history, physical exam, and MDM). Additional findings are as noted. Patient brought in by EMS as a trauma prior to. Patient was reportedly standing at the nurses station at her extended care facility when she fell and then possibly had seizure-like activity. Patient does have a history of dementia. It is unknown if she has seizure history. Patient is confused on arrival but airway is intact and has equal breath sounds. Patient is following commands. We'll get an EKG. We will await trauma recommendations.     EKG Interpretation    Interpreted by me    Rhythm: normal sinus   Rate: normal  Axis: normal  Ectopy: none  Conduction: normal  ST Segments: no acute change  T Waves: no acute change  Q Waves: none    Clinical Impression: no acute changes and normal EKG    Lucie Warren MD  Attending Emergency  Physician              Lisa Chung MD  05/01/19 [address], [place]  05/01/19 1800

## 2019-05-01 NOTE — FLOWSHEET NOTE
[address]     Emergency/Trauma Note    PATIENT NAME: Maliha Miller    Shift date: 5/1/19  Shift day: Wednesday   Shift # 2    Room # 25/25   Name: Maliha Miller            Age: 66 y.o. Gender: female          Congregation: Religion   Place of Anabaptism: unknown    Trauma/Incident type: Adult Trauma Priority  Admit Date & Time: 5/1/2019  4:50 PM    PATIENT/EVENT DESCRIPTION:  Maliha Miller is a 66 y.o. female who arrived via EMS from a nursing facility where she resides. Per report pt was standing at the nurse's station at the facility when she suddenly collapsed with what is suspected to be seizure activity. Pt has history of dementia but no history of seizure activity. Pt to be admitted to 25/25. SPIRITUAL ASSESSMENT/INTERVENTION:  Pt engages in confused conversation, but seems comforted by  presence and prayer. Per report, nursing facility is contacting daughter. PATIENT BELONGINGS:  No belongings noted    ANY BELONGINGS OF SIGNIFICANT VALUE NOTED: no    REGISTRATION STAFF NOTIFIED? Yes      WHAT IS YOUR SPIRITUAL CARE PLAN FOR THIS PATIENT?:   Chaplains will remain available to offer spiritual and emotional support as needed.     Electronically signed by Wai Davenport on 5/1/2019 at 5:26 PM.  Chuckie Reza  [phone]

## 2019-05-01 NOTE — ED PROVIDER NOTES
[address]  ED  Emergency Department Encounter  EmergencyMedicine Resident     Pt Jelly Chand  MRN: [id number]  [place] 1945  Date of evaluation: 5/1/19  PCP:  JULIETH Rodriguez CNP    CHIEF COMPLAINT       No chief complaint on file. HISTORY OF PRESENT ILLNESS  (Location/Symptom, Timing/Onset, Context/Setting, Quality, Duration, Modifying Factors, Severity.)      Mirtha Wang is a 66 y.o. female who presents after a fall. Patient brought from nursing home and per EMS had witnessed fall from standing height. Patient was supposedly standing and just collapsed to the ground. Had an episode of shaking/seizure-like activity per EMS after falling to the ground. Patient with no known history of seizures. EMS unsure if patient was on any blood thinners. Patient was altered on arrival but does have history of baseline dementia and EMS states that now she is talking albeit confused. PAST MEDICAL / SURGICAL / SOCIAL / FAMILY HISTORY      has a past medical history of Blood clotting disorder ([address] GERD (gastroesophageal reflux disease), Irregular heartbeat, and Menopause.     has a past surgical history that includes sinus surgery (4/16/2011); lumbar fusion (1997); Tonsillectomy (1951); shoulder surgery (2000); pelvic laparoscopy (4555); and Colonoscopy (2008 ).     Social History     Socioeconomic History    Marital status: Legally      Spouse name: Not on file    Number of children: Not on file    Years of education: Not on file    Highest education level: Not on file   Occupational History    Not on file   Social Needs    Financial resource strain: Not on file    Food insecurity:     Worry: Not on file     Inability: Not on file    Transportation needs:     Medical: Not on file     Non-medical: Not on file   Tobacco Use    Smoking status: Never Smoker    Smokeless tobacco: Never Used   Substance and Sexual Activity    Alcohol use: Yes     Comment: RARE rub.   No murmur heard. Pulmonary/Chest: Effort normal and breath sounds normal. No respiratory distress. She has no wheezes. She has no rales. Abdominal: Soft. She exhibits no distension and no mass. There is no tenderness. There is no rebound and no guarding. Musculoskeletal: She exhibits no edema, tenderness or deformity. Neurological: She is alert. GCS eye subscore is 4. GCS verbal subscore is 4. GCS motor subscore is 6. Awake. Confused. Does follow some basic commands. No obvious focal neurologic deficit. Moves all extremities symmetrically   Skin: Skin is warm and dry. No rash noted. She is not diaphoretic. DIFFERENTIAL  DIAGNOSIS     PLAN (LABS / IMAGING / EKG):  Orders Placed This Encounter   Procedures    CT HEAD WO CONTRAST    CT CERVICAL SPINE WO CONTRAST    XR CHEST PORTABLE    TROP/MYOGLOBIN    Trauma Panel    TEG, Rapid Citrated    Urinalysis    Inpatient consult to Internal Medicine    EKG 12 Lead    Type and Screen       MEDICATIONS ORDERED:  No orders of the defined types were placed in this encounter.       DDX: Fall, syncope, cardiogenic syncope, seizure, electrolyte abnormality, intracerebral hemorrhage    DIAGNOSTIC RESULTS / EMERGENCY DEPARTMENT COURSE / MDM     LABS:  Results for orders placed or performed during the hospital encounter of 05/01/19   TROP/MYOGLOBIN   Result Value Ref Range    Troponin, High Sensitivity 7 0 - 14 ng/L    Troponin T NOT REPORTED <0.03 ng/mL    Troponin Interp NOT REPORTED     Myoglobin 40 25 - 58 ng/mL   Trauma Panel   Result Value Ref Range    WBC 5.7 3.5 - 11.3 k/uL    RBC 4.67 3.95 - 5.11 m/uL    Hemoglobin 13.9 11.9 - 15.1 g/dL    Hematocrit 42.5 36.3 - 47.1 %    MCV 91.0 82.6 - 102.9 fL    MCH 29.8 25.2 - 33.5 pg    MCHC 32.7 28.4 - 34.8 g/dL    RDW 12.1 11.8 - 14.4 %    Platelets 341 505 - 324 k/uL    MPV 9.4 8.1 - 13.5 fL    NRBC Automated 0.0 0.0 per 100 WBC    Sodium 142 135 - 144 mmol/L    Potassium 4.5 3.7 - 5.3 mmol/L Contrast    Result Date: 5/1/2019  EXAMINATION: CT OF THE HEAD WITHOUT CONTRAST  5/1/2019 4:57 pm TECHNIQUE: CT of the head was performed without the administration of intravenous contrast. Dose modulation, iterative reconstruction, and/or weight based adjustment of the mA/kV was utilized to reduce the radiation dose to as low as reasonably achievable. COMPARISON: None. HISTORY: ORDERING SYSTEM PROVIDED HISTORY: fall, ?seizure TECHNOLOGIST PROVIDED HISTORY: FINDINGS: BRAIN/VENTRICLES: The ventricles and sulci are diffusely enlarged. Low attenuation is seen in the periventricular and subcortical white matter. No acute intracranial hemorrhage or acute infarct is identified. ORBITS: The visualized portion of the orbits demonstrate no acute abnormality. SINUSES: The visualized paranasal sinuses and mastoid air cells demonstrate no acute abnormality. SOFT TISSUES/SKULL:  No acute abnormality of the visualized skull or soft tissues. No acute intracranial abnormality. Ct Cervical Spine Wo Contrast    Result Date: 5/1/2019  EXAMINATION: CT OF THE CERVICAL SPINE WITHOUT CONTRAST 5/1/2019 4:57 pm TECHNIQUE: CT of the cervical spine was performed without the administration of intravenous contrast. Multiplanar reformatted images are provided for review. Dose modulation, iterative reconstruction, and/or weight based adjustment of the mA/kV was utilized to reduce the radiation dose to as low as reasonably achievable. COMPARISON: None. HISTORY: ORDERING SYSTEM PROVIDED HISTORY: fall, ?seizure FINDINGS: BONES/ALIGNMENT: There is no evidence of an acute cervical spine fracture. There is normal alignment of the cervical spine. DEGENERATIVE CHANGES: Degenerative and degenerative disc changes are present C4 through C7 with posterior osteophytic formation and bilateral moderate neural foraminal narrowing C5-C6, C6-C7 with bilateral mild neural foraminal narrowing C4-C5.   Moderate right neural foraminal narrowing C3-C4 is evident. Mild bony canal stenosis C4-C5 due to spondylosis. Diffuse mild facet changes are noted. SOFT TISSUES: There is no prevertebral soft tissue swelling. Lung apices are unremarkable. Estimated biologic radiation dose for this procedure:1306.75 mGy/cm2. No acute abnormality of the cervical spine. Multilevel degenerative changes with multilevel neural foraminal narrowing. Xr Chest Portable    Result Date: 5/1/2019  EXAMINATION: SINGLE XRAY VIEW OF THE CHEST 5/1/2019 4:55 pm COMPARISON: 03/07/2019 HISTORY: ORDERING SYSTEM PROVIDED HISTORY: trauma TECHNOLOGIST PROVIDED HISTORY: trauma FINDINGS: The lungs are without acute focal process. There is no effusion or pneumothorax. The cardiomediastinal silhouette is stable. The osseous structures are stable. No acute process. EKG  EKG Interpretation    Interpreted by emergency department physician    Rhythm: normal sinus   Rate: normal  Axis: normal  Ectopy: none  Conduction: normal, poor R wave progression anterior leads  ST Segments: no acute change  T Waves: no acute change  Q Waves: none    Clinical Impression: No acute ischemic changes when compared to EKG on 3/08/19    Jose Francisco [facility]      All EKG's are interpreted by the Emergency Department Physician who either signs or Co-signs this chart in the absence of a cardiologist.    EMERGENCY DEPARTMENT COURSE:  5:46 PM: Imaging negative. Trauma recommends medicine admission. Medicine paged for admission. 5:58 PM: Spoke to internal medicine. Patient accepted for admission. PROCEDURES:  None    CONSULTS:  IP CONSULT TO INTERNAL MEDICINE    CRITICAL CARE:  None    FINAL IMPRESSION      1. Syncope and collapse    2. Seizure-like activity ([facility] [address]          DISPOSITION / PLAN     DISPOSITION Decision To Admit 05/01/2019 04:54:40 PM      PATIENT REFERRED TO:  No follow-up provider specified.     DISCHARGE MEDICATIONS:  New Prescriptions    No medications on file       Manny Cabrera, DO  Emergency Medicine Resident    (Please note that portions of thisnote were completed with a voice recognition program.  Efforts were made to edit the dictations but occasionally words are mis-transcribed.)        Pranav Nguyen DO  05/01/19 2679

## 2019-05-01 NOTE — CONSULTS
TRAUMA HISTORY AND PHYSICAL EXAMINATION  (V 2.0)    PATIENT NAME: Kathy Limon  YOB: 1945  MEDICAL RECORD NO. [id number]   DATE: 5/1/2019  PRIMARY CARE PHYSICIAN: JULIETH Hardin CNP  PATIENT EVALUATED AT THE REQUEST OF DRSera:  Stacy Thakur    ACTIVATION   []Trauma Alert     [x] Trauma Priority     []Trauma Consult. IMPRESSION:     Patient Active Problem List   Diagnosis    GERD (gastroesophageal reflux disease)    Irregular heartbeat    Blood clotting disorder (HCC)    Menopause    Chest pain    Syncope and collapse     · 66 y.o F with syncope and fall from [facility] without traumatic injuries    MEDICAL DECISION MAKING AND PLAN:     · Recommend admit to medicine service for syncope and supposed witnessed seizure like activity   · OK for diet  · CTLS cleared by imaging and clinical exam  · Will need tertiary exam and trauma team will sign off  · F/u trauma panel, and urine drug screen. CONSULT SERVICES    [] Neurosurgery     [] Orthopedic Surgery    [] Cardiothoracic     [] Facial Trauma    [] Plastic Surgery (Burn)    [] Pediatric Surgery     [x] Internal Medicine    [] Pulmonary Medicine    [] Other:       HISTORY:     SOURCE OF INFORMATION  Patient information was obtained from EMS personnel. History/Exam limitations: dementia. INJURY SUMMARY  None    GENERAL DATA  Age 66 y.o.  female   Patient information was obtained from EMS personnel. History/Exam limitations: dementia.   Patient presented to the Emergency Department by ambulance  Injury Date: 5/1/2019   Approximate Injury Time:  4pm      Transport mode:   [x]Ambulance      [] Helicopter     []Car       [] Other  Referring Hospital: [place] [address], (e.g., home, farm, industry, street)  Specific Details of Location (e.g., bedroom, kitchen, garage): nursing home      [facility]  []Motor Vehicle Collision  Specific vehicle type involved (e.g., sedan, minivan, SUV, pickup truck):   Collision with (e.g., type of vehicle, building, barn, ditch, tree):   []Single Vehicle Collision     []           []Fatality in Same Vehicle            []Passenger:      []Front Seat        []Rear Seat    []Unrestrained   []Lap Belt Only Restrained   [] Shoulder Belt Only Restrained  [] 3 Point Restrained    []Front Air Bag  []Side Air Bag  []Other Air Bag []Air Bag Not Deployed    []Ejected     []Rollover     []Extricated       CHILD:  []Booster Seat  []Infant Car Seat  [] Child Car Seat   []Motorcycle Collision Wearing Helmet     []Yes     []No    []Unknown  []Bicycle Collision Wearing Helmet     []Yes     []No    []Unknown  []Pedestrian Struck      [x]Fall    [x]From Standing     []From Height   Ft     []Down Stairs  []Assault  []Gunshot  Specify caliber / type of gun: ____________________________  []Stabbing  Specify weapon type, size: _____________________________  []Burn     []Flame   []Scald   []Electrical   []Chemical           []Contact   []Inhalation   []House fire  []Other ______________________________________________________  []Other protective devices used / worn ___________________________    HISTORY: 66year old female with hx of dementia who reportedly fell from standing height at her long term care facility. Reports of seizure like activity as well. Patient is alert but confused. Unknown LOC or medications. Loss of Consciousness []No   []Yes Duration(min) unknown   Total Fluids Given Prior To Arrival 0 cc    MEDICATIONS:   []  None     []  Information not available due to exam limitations documented above  Prior to Admission medications    Medication Sig Start Date End Date Taking?  Authorizing Provider   rivastigmine (EXELON) 9.5 MG/24HR Place 1 patch onto the skin daily    Carito Gavlez MD   OXcarbazepine (TRILEPTAL) 150 MG tablet Take 150 mg by mouth 2 times daily Indications: Unknown strength per daughter    Carito Galvez MD       ALLERGIES:   []  None    []   Information not available due to exam limitations documented above   Other    PAST MEDICAL HISTORY: []  None   []   Information not available due to exam limitations documented above    has a past medical history of Blood clotting disorder ([address] GERD (gastroesophageal reflux disease), Irregular heartbeat, and Menopause.  has a past surgical history that includes sinus surgery (4/16/2011); lumbar fusion (1997); Tonsillectomy (1951); shoulder surgery (2000); pelvic laparoscopy (1990); and Colonoscopy (2008 ). FAMILY HISTORY   []   Information not available due to exam limitations documented above    family history includes Heart Attack in her father; Stroke in her mother. SOCIAL HISTORY  []   Information not available due to exam limitations documented above     reports that she has never smoked. She has never used smokeless tobacco.   reports that she drinks alcohol. reports that she does not use drugs. PERTINENT SYSTEMIC REVIEW:  []   Information not available due to exam limitations documented above    General: Denies fever, chills, night sweats, weight loss, malaise, fatigue  HEENT: Denies sore throat, sinus problems, allergic rhinosinusitis  Card: Denies chest pain, palpitations, orthopnea/PND. Denies h/o murmurs  Pulm: Denies cough, shortness of breath, CARLIN  GI:  per HPI; denies history of constipation, diarrhea, hematochezia or melena  : Denies polyuria, dysuria, hematuria  Endo: Denies diabetes, thyroid problems. Heme: Denies anemia, h/o bleeding or clotting problems. Neuro: Denies h/o CVA, TIA  Skin: Denies rashes, ulcers  Musculoskeletal: Denies muscle, joint, back pain.       PHYSICAL EXAMINATION:   GLASCOW COMA SCALE  NEUROMUSCULAR BLOCKADE PRIOR TO ARRIVAL     [x]No        []Yes      Variable  Score   Variable  Score  Eye opening [x]Spontaneous 4 Verbal  []Oriented  5     []To voice  3   [x]Confused  4    []To pain  2   []Inapp words  3    []None  1   []Incomp words 2       []None  1   Motor   [x]Obeys  6    []Localizes pain 5    []Withdraws(pain) 4    []Flexion(pain) 3  []Extension(pain) 2    []None  1     GCS Total = 14    PHYSICAL EXAMINATION  VITAL SIGNS: There were no vitals filed for this visit. General Appearance: alert and pleasantly confused, well developed and well- nourished, in no acute distress  Skin: warm and dry, no rash or erythema  Head: normocephalic and atraumatic  Eyes: pupils equal, round, and reactive to light, extraocular eye movements intact, conjunctivae normal  ENT: tympanic membrane, external ear and ear canal normal bilaterally, nose without deformity, nasal mucosa and turbinates normal without polyps  Neck: C-collar present, supple and non-tender without mass, no thyromegaly or thyroid nodules, no cervical lymphadenopathy  Pulmonary/Chest: Equal breath sounds b/l  Cardiovascular: S1S2  Abdomen: soft, non-tender, non-distended  Pelvis:  Stable  Back:  No abrasions/lesions. No obvious deformities. No TTP. No step-offs  Rectal: Sphincter tone intact, No gross blood  Extremities: palpable radial, femoral, and pedal pulses b/l  Musculoskeletal: normal range of motion, no joint swelling, deformity or tenderness  Neurologic: reflexes normal and symmetric, no cranial nerve deficit, gait, coordination and speech normal    FOCUSED ABDOMINAL SONOGRAM FOR TRAUMA (FAST): A good  quality examination was performed by Dr. Yadira Drummond and representative images were obtained.   [x] No free fluid in the abdomen       RADIOLOGY  PLAIN FILMS  Ordered Findings  [x]CHEST []Normal  []PELVIS  []Normal    EXTREMITIES      []RUE []Normal   _____________________    []LUE  []Normal   _____________________    []RLE []Normal   _____________________    []LLE  []Normal   _____________________  []OTHER []Normal   _____________________    CT SCANS  Ordered  [x]HEAD  []Normal     []CHEST  []Normal     []ABD/PELVIS[]Normal   []FACE []Normal     [x]C-SPINE  []Normal      []T-SPINE  []Normal   []L-SPINE  []Normal     [facility]

## 2019-05-02 PROBLEM — F02.80 LEWY BODY DEMENTIA (HCC): Status: ACTIVE | Noted: 2019-05-02

## 2019-05-02 PROBLEM — G31.83 LEWY BODY DEMENTIA (HCC): Status: ACTIVE | Noted: 2019-05-02

## 2019-05-02 LAB
ABSOLUTE EOS #: <0.03 K/UL (ref 0–0.44)
ABSOLUTE IMMATURE GRANULOCYTE: <0.03 K/UL (ref 0–0.3)
ABSOLUTE LYMPH #: 2.27 K/UL (ref 1.1–3.7)
ABSOLUTE MONO #: 0.65 K/UL (ref 0.1–1.2)
ANION GAP SERPL CALCULATED.3IONS-SCNC: 11 MMOL/L (ref 9–17)
BASOPHILS # BLD: 1 % (ref 0–2)
BASOPHILS ABSOLUTE: 0.03 K/UL (ref 0–0.2)
BILIRUBIN URINE: NEGATIVE
BUN BLDV-MCNC: 17 MG/DL (ref 8–23)
BUN/CREAT BLD: ABNORMAL (ref 9–20)
CALCIUM SERPL-MCNC: 9.1 MG/DL (ref 8.6–10.4)
CHLORIDE BLD-SCNC: 108 MMOL/L (ref 98–107)
CO2: 23 MMOL/L (ref 20–31)
COLOR: YELLOW
COMMENT UA: NORMAL
CREAT SERPL-MCNC: 0.65 MG/DL (ref 0.5–0.9)
DIFFERENTIAL TYPE: ABNORMAL
EKG ATRIAL RATE: 73 BPM
EKG P AXIS: 76 DEGREES
EKG P-R INTERVAL: 146 MS
EKG Q-T INTERVAL: 378 MS
EKG QRS DURATION: 74 MS
EKG QTC CALCULATION (BAZETT): 416 MS
EKG R AXIS: 64 DEGREES
EKG T AXIS: 63 DEGREES
EKG VENTRICULAR RATE: 73 BPM
EOSINOPHILS RELATIVE PERCENT: 0 % (ref 1–4)
FOLATE: 8.9 NG/ML
GFR AFRICAN AMERICAN: >60 ML/MIN
GFR NON-AFRICAN AMERICAN: >60 ML/MIN
GFR SERPL CREATININE-BSD FRML MDRD: ABNORMAL ML/MIN/{1.73_M2}
GFR SERPL CREATININE-BSD FRML MDRD: ABNORMAL ML/MIN/{1.73_M2}
GLUCOSE BLD-MCNC: 100 MG/DL (ref 70–99)
GLUCOSE URINE: NEGATIVE
HCT VFR BLD CALC: 43.5 % (ref 36.3–47.1)
HEMOGLOBIN: 14.2 G/DL (ref 11.9–15.1)
IMMATURE GRANULOCYTES: 0 %
KETONES, URINE: NEGATIVE
LEUKOCYTE ESTERASE, URINE: NEGATIVE
LYMPHOCYTES # BLD: 35 % (ref 24–43)
MAGNESIUM: 2.4 MG/DL (ref 1.6–2.6)
MCH RBC QN AUTO: 30 PG (ref 25.2–33.5)
MCHC RBC AUTO-ENTMCNC: 32.6 G/DL (ref 28.4–34.8)
MCV RBC AUTO: 92 FL (ref 82.6–102.9)
MONOCYTES # BLD: 10 % (ref 3–12)
NITRITE, URINE: NEGATIVE
NRBC AUTOMATED: 0 PER 100 WBC
PDW BLD-RTO: 11.8 % (ref 11.8–14.4)
PH UA: 7 (ref 5–8)
PLATELET # BLD: 245 K/UL (ref 138–453)
PLATELET ESTIMATE: ABNORMAL
PMV BLD AUTO: 9.5 FL (ref 8.1–13.5)
POTASSIUM SERPL-SCNC: 3.5 MMOL/L (ref 3.7–5.3)
PROTEIN UA: NEGATIVE
RBC # BLD: 4.73 M/UL (ref 3.95–5.11)
RBC # BLD: ABNORMAL 10*6/UL
SEG NEUTROPHILS: 54 % (ref 36–65)
SEGMENTED NEUTROPHILS ABSOLUTE COUNT: 3.46 K/UL (ref 1.5–8.1)
SODIUM BLD-SCNC: 142 MMOL/L (ref 135–144)
SPECIFIC GRAVITY UA: 1.01 (ref 1–1.03)
TURBIDITY: CLEAR
URINE HGB: NEGATIVE
UROBILINOGEN, URINE: NORMAL
VITAMIN B-12: 319 PG/ML (ref 232–1245)
WBC # BLD: 6.5 K/UL (ref 3.5–11.3)
WBC # BLD: ABNORMAL 10*3/UL

## 2019-05-02 PROCEDURE — 99223 1ST HOSP IP/OBS HIGH 75: CPT | Performed by: INTERNAL MEDICINE

## 2019-05-02 PROCEDURE — 95816 EEG AWAKE AND DROWSY: CPT | Performed by: PSYCHIATRY & NEUROLOGY

## 2019-05-02 PROCEDURE — 2580000003 HC RX 258: Performed by: STUDENT IN AN ORGANIZED HEALTH CARE EDUCATION/TRAINING PROGRAM

## 2019-05-02 PROCEDURE — 99222 1ST HOSP IP/OBS MODERATE 55: CPT | Performed by: PSYCHIATRY & NEUROLOGY

## 2019-05-02 PROCEDURE — 4A10X4Z MONITORING OF CENTRAL NERVOUS ELECTRICAL ACTIVITY, EXTERNAL APPROACH: ICD-10-PCS | Performed by: INTERNAL MEDICINE

## 2019-05-02 PROCEDURE — 82746 ASSAY OF FOLIC ACID SERUM: CPT

## 2019-05-02 PROCEDURE — 2500000003 HC RX 250 WO HCPCS: Performed by: STUDENT IN AN ORGANIZED HEALTH CARE EDUCATION/TRAINING PROGRAM

## 2019-05-02 PROCEDURE — 6370000000 HC RX 637 (ALT 250 FOR IP): Performed by: STUDENT IN AN ORGANIZED HEALTH CARE EDUCATION/TRAINING PROGRAM

## 2019-05-02 PROCEDURE — 82607 VITAMIN B-12: CPT

## 2019-05-02 PROCEDURE — 81003 URINALYSIS AUTO W/O SCOPE: CPT

## 2019-05-02 PROCEDURE — 6370000000 HC RX 637 (ALT 250 FOR IP): Performed by: PSYCHIATRY & NEUROLOGY

## 2019-05-02 PROCEDURE — 1200000000 HC SEMI PRIVATE

## 2019-05-02 PROCEDURE — 85025 COMPLETE CBC W/AUTO DIFF WBC: CPT

## 2019-05-02 PROCEDURE — 95816 EEG AWAKE AND DROWSY: CPT

## 2019-05-02 PROCEDURE — 36415 COLL VENOUS BLD VENIPUNCTURE: CPT

## 2019-05-02 PROCEDURE — 80048 BASIC METABOLIC PNL TOTAL CA: CPT

## 2019-05-02 PROCEDURE — 6360000002 HC RX W HCPCS: Performed by: STUDENT IN AN ORGANIZED HEALTH CARE EDUCATION/TRAINING PROGRAM

## 2019-05-02 PROCEDURE — 83735 ASSAY OF MAGNESIUM: CPT

## 2019-05-02 RX ORDER — OLANZAPINE 10 MG/1
5 INJECTION, POWDER, LYOPHILIZED, FOR SOLUTION INTRAMUSCULAR ONCE
Status: COMPLETED | OUTPATIENT
Start: 2019-05-02 | End: 2019-05-02

## 2019-05-02 RX ORDER — QUETIAPINE FUMARATE 25 MG/1
25 TABLET, FILM COATED ORAL NIGHTLY
Status: DISCONTINUED | OUTPATIENT
Start: 2019-05-02 | End: 2019-05-03 | Stop reason: HOSPADM

## 2019-05-02 RX ORDER — MEMANTINE HYDROCHLORIDE 5 MG/1
5 TABLET ORAL 2 TIMES DAILY
Status: DISCONTINUED | OUTPATIENT
Start: 2019-05-02 | End: 2019-05-03 | Stop reason: HOSPADM

## 2019-05-02 RX ORDER — BUSPIRONE HYDROCHLORIDE 10 MG/1
10 TABLET ORAL 2 TIMES DAILY
COMMUNITY

## 2019-05-02 RX ADMIN — MEMANTINE HYDROCHLORIDE 5 MG: 5 TABLET, FILM COATED ORAL at 21:55

## 2019-05-02 RX ADMIN — OXCARBAZEPINE 150 MG: 150 TABLET, FILM COATED ORAL at 08:17

## 2019-05-02 RX ADMIN — Medication 10 ML: at 08:17

## 2019-05-02 RX ADMIN — QUETIAPINE FUMARATE 25 MG: 25 TABLET ORAL at 21:55

## 2019-05-02 RX ADMIN — POTASSIUM BICARBONATE 40 MEQ: 782 TABLET, EFFERVESCENT ORAL at 09:10

## 2019-05-02 RX ADMIN — Medication 10 ML: at 21:55

## 2019-05-02 RX ADMIN — WATER 2.1 ML: 1 INJECTION INTRAMUSCULAR; INTRAVENOUS; SUBCUTANEOUS at 00:37

## 2019-05-02 RX ADMIN — OLANZAPINE 5 MG: 10 INJECTION, POWDER, LYOPHILIZED, FOR SOLUTION INTRAMUSCULAR at 00:37

## 2019-05-02 ASSESSMENT — ENCOUNTER SYMPTOMS
WHEEZING: 0
STRIDOR: 0
VOMITING: 0
SORE THROAT: 0
CONSTIPATION: 0
CHEST TIGHTNESS: 0
ABDOMINAL DISTENTION: 0
EYE REDNESS: 0
EYE DISCHARGE: 0
SHORTNESS OF BREATH: 0
ABDOMINAL PAIN: 0
APNEA: 0
DIARRHEA: 0
COUGH: 0
NAUSEA: 0

## 2019-05-02 ASSESSMENT — PAIN DESCRIPTION - LOCATION: LOCATION: GENERALIZED

## 2019-05-02 ASSESSMENT — PAIN SCALES - GENERAL: PAINLEVEL_OUTOF10: 6

## 2019-05-02 ASSESSMENT — PAIN DESCRIPTION - PAIN TYPE: TYPE: ACUTE PAIN

## 2019-05-02 NOTE — CARE COORDINATION
Case Management Initial Discharge Plan  [facility],             Met with:patient to discuss discharge plans. Information verified: address, contacts, phone number, , insurance Yes  PCP: Debria Nageotte, APRN - CNP  Date of last visit:  --     Insurance Provider: Jocelynn    Discharge Planning    Living Arrangements:  Alone   Support Systems:  Children, Friends/Neighbors    Home has   stories    stairs to climb to get into front door,  stairs to climb to reach second floor  Location of bedroom/bathroom in home      Patient able to perform ADL's:Independent/Assisted    Current Services (outpatient & in home) n/a  DME equipment: n/a  DME provider: n/a    [facility] [address] [address] Medications:  No  Does patient want to participate in local refill/ meds to beds program?  No    Potential Assistance Needed:  Chuckie Medel    Patient agreeable to home care: No  [place] of choice provided:  n/a    Prior SNF/Rehab Placement and Facility: Yes - [facility]  Agreeable to SNF/Rehab: Yes  [place] of choice provided: yes   Evaluation: n/a    Expected Discharge date:  19  Patient expects to be discharged to:  [facility]  Follow Up Appointment: Best Day/ Time:      Transportation provider: family  Transportation arrangements needed for discharge: No     Readmission Risk              Risk of Unplanned Readmission:        12             Does patient have a readmission risk score greater than 14?: No  If yes, follow-up appointment must be made within 7 days of discharge. Discharge Plan: Return to [facility]  Spoke to patient's daughter/MARSHAL Luna ([phone]), states her mother had just moved into behavioral unit at [facility], plans to return there. Graciela Ayers states she has insurance cards, POA, and living will paperwork and writer requested she bring it in for chart copies to be made.  States she and a neighbor were

## 2019-05-02 NOTE — PROCEDURES
[facility]      ELECTROENCEPHALOGRAM REPORT        REFERRING PHYSICIAN:  Rajinder Mccormack MD  PATIENT Bryson Cheney  PATIENT MRN: [id number]  DATE OF EE2019    BRIEF HISTORY:  66year old female with dementia, fell from standing height, had seizure like activity, EEG to evaluate. CURRENT ANTI-EPILEPTIC MEDICATIONS:  OXC 150mg BID    EEG DESCRIPTION:   During maximal wakefulness, the background was continuous but moderately to severely slow consisting of 2-6 Hz delta and theta activity, ranging between 10-50 uV. There was a posterior dominant rhythm at 5-6 Hz. Spontaneous variability and reactivity to stimulation were present. There was intermittent rhythmic slow, generalized in delta frequency at 1-3Hz, lasted 1-4 seconds. No epileptiform discharges or seizures were recorded. Stage I sleep was present. Photic stimulation did not activate abnormal activity, hyperventilation was not performed. Heart rate was regular at ~90 beats per minute on a single lead EKG. CLASSIFICATION   Abnormal significant II (Lethargy)  1. Intermittent rhythmic slow, generalized  2. Continuous slow, generalized    IMPRESSION:   This was an abnormal routine EEG which showed evidence of moderate to severe diffuse encephalopathy, not specific to etiology. Possible etiologies include toxic metabolic derangement or medication effects. No epileptiform discharges or EEG seizures were noted on this recording.      William Harmon MD, [address] [place] [address], Neurology  Board Certified Epileptologist

## 2019-05-02 NOTE — DISCHARGE INSTR - COC
(51.3 kg)     Mental Status:  disoriented and alert    IV Access:  - None    Nursing Mobility/ADLs:  Walking   Independent  Transfer  Independent  Bathing  Assisted  Dressing  Independent  Toileting  Independent  Feeding  Dependent  Med [address]  Dependent  Med Delivery   none    Wound Care Documentation and Therapy:        Elimination:  Continence:   · Bowel: Yes  · Bladder: Yes  Urinary Catheter: None   Colostomy/Ileostomy/Ileal Conduit: No       Date of Last BM: ***  No intake or output data in the 24 hours ending 05/02/19 1814  No intake/output data recorded. Safety Concerns: At Risk for Falls    Impairments/Disabilities:      None    Nutrition Therapy:  Current Nutrition Therapy:   - Oral Diet:  General    Routes of Feeding: Oral  Liquids: No Restrictions  Daily Fluid Restriction: no  Last Modified Barium Swallow with Video (Video Swallowing Test): not done    Treatments at the Time of Hospital Discharge:   Respiratory Treatments: ***  Oxygen Therapy:  is not on home oxygen therapy. Ventilator:    - No ventilator support    Rehab Therapies: ***  Weight Bearing Status/Restrictions: No weight bearing restirctions  Other Medical Equipment (for information only, NOT a DME order):  ***  Other Treatments: ***    Patient's personal belongings (please select all that are sent with patient):   All belongings sent home with patient    RN SIGNATURE:  Ishmael Lopez RN    CASE MANAGEMENT/SOCIAL WORK SECTION    Inpatient Status Date: 5/1/19    Readmission Risk Assessment Score:  Readmission Risk              Risk of Unplanned Readmission:        12           Discharging to Facility/ Agency   · Name:    [facility]  · Address:  · Phone:     [phone]  · Fax:       [phone]    Dialysis Facility (if applicable)   · Name:  · Address:  · Dialysis Schedule:  · Phone:  · Fax:    / signature: Electronically signed by Hetal Qureshi RN on 5/3/19 at 12:54 PM    PHYSICIAN SECTION    Prognosis: Fair    Condition at Discharge: Stable    Rehab Potential (if transferring to Rehab): Fair    Recommended Labs or Other Treatments After Discharge: none    Physician Certification: I certify the above information and transfer of Alfred Rogers  is necessary for the continuing treatment of the diagnosis listed and that she requires [place] for greater 30 days.      Update Admission H&P: No change in H&P    PHYSICIAN SIGNATURE:  Electronically signed by Kathie Ganser, MD on 5/3/19 at 10:01 AM

## 2019-05-02 NOTE — PROGRESS NOTES
SENIOR NOTE  67 y/o F with history of lewy body dementia, confused at baseline per report presents after fall from standing height. Came in as a trauma but CTLS has been cleared and trauma has signed off. Patient lives in nursing home and had witnessed episode of syncope and had some jerking movements. Per chart review, patient has been seen in ED before for similar symptoms, patient takes trileptal at home. Patient last saw neurology in January 2018 in [facility] and per note: \"She appears to have dementia of unclear etiology but appears to be most consistent with early onset lewy body dementia given her visual hallucinations and frequent periods of disorientation and confusion\" At that time, TSH, Vitamin B12 and folate, Syphilis IgG and ESR were wnl. Patient was supposed to follow up with neurology but did not- at that time, she was living with her daughter, but now she is at nursing home. Last MRI done in Jan 2018 but results not in chart. Patient was also noted to be on memantine but not listed in home meds.      Neuro consult  Follow up prolactin level  Follow up trileptal level  Resume trileptal and rivastigmine  Syncope workup - echo  Per chart review, hx of \"irreg heart beat\"- follow up EKG  DVT PPX with lovenox    Parth Strong MD, PGY-2  Internal Medicine  [address]  5/1/19  9:35 PM

## 2019-05-02 NOTE — CONSULTS
Neurology Consult Note      Reason for Consult:  Seizure like activity  Requesting Physician:  Dr. Ward Frederick:  Seizure like activity after trauma    History Obtained From:  patient, electronic medical record       HISTORY OF PRESENT ILLNESS:              The patient is a 66 y.o. female with significant past medical history of Lewy body dementia who presents with seizure like activity that occurred earlier today after a fall at her ECF. She has no history of seizures according to records. Patient was confused and not AOx3. EEG ordered, awaiting results. Trauma workup was found to be negative. Patient has not had any seizure like episodes since admission.      Past Medical History:        Diagnosis Date    Blood clotting disorder ([address] 4/2011    GERD (gastroesophageal reflux disease) 4/2011    Irregular heartbeat 2008    Menopause 1999     Past Surgical History:        Procedure Laterality Date    COLONOSCOPY  2008     due 2016    [address]    Laparotomy d/t ectopic    SHOULDER SURGERY  2000    SINUS SURGERY  4/16/2011    TONSILLECTOMY  1951     Current Medications:   Current Facility-Administered Medications: QUEtiapine (SEROQUEL) tablet 25 mg, 25 mg, Oral, Nightly  sodium chloride flush 0.9 % injection 10 mL, 10 mL, Intravenous, 2 times per day  sodium chloride flush 0.9 % injection 10 mL, 10 mL, Intravenous, PRN  potassium chloride (KLOR-CON M) extended release tablet 40 mEq, 40 mEq, Oral, PRN **OR** potassium bicarb-citric acid (EFFER-K) effervescent tablet 40 mEq, 40 mEq, Oral, PRN **OR** potassium chloride 10 mEq/100 mL IVPB (Peripheral Line), 10 mEq, Intravenous, PRN  magnesium sulfate 1 g in dextrose 5% 100 mL IVPB, 1 g, Intravenous, PRN  magnesium hydroxide (MILK OF MAGNESIA) 400 MG/5ML suspension 30 mL, 30 mL, Oral, Daily PRN  ondansetron (ZOFRAN) injection 4 mg, 4 mg, Intravenous, Q6H PRN  enoxaparin (LOVENOX) injection 40 mg, 40 mg, strength of grade 5/5 in proximal and distal muscle groups   LUE: Significant for good strength of grade 5/5 in proximal and distal muscle groups   RLE: Significant for good strength of grade 5/5 in proximal and distal muscle groups   LLE: Significant for good strength of grade 5/5 in proximal and distal muscle groups      Normal bulk, normal tone and no involuntary movements, no tremor   SENSORY FUNCTION:  Normal touch, normal pin, normal vibration, normal proprioception   CEREBELLAR FUNCTION:  Intact fine motor control over upper limbs and lower limbs   REFLEX FUNCTION:  Symmetric in upper and lower extremities, no Babinski sign   STATION and GAIT  Normal gait and tandem station, normal tip toes and heel walking              Additional Examination Elements and Findings:           DATA  Recent Results (from the past 24 hour(s))   Urinalysis Reflex to Culture    Collection Time: 05/02/19  4:00 AM   Result Value Ref Range    Color, UA YELLOW YELLOW    Turbidity UA CLEAR CLEAR    Glucose, Ur NEGATIVE NEGATIVE    Bilirubin Urine NEGATIVE NEGATIVE    Ketones, Urine NEGATIVE NEGATIVE    Specific Gravity, UA 1.011 1.005 - 1.030    Urine Hgb NEGATIVE NEGATIVE    pH, UA 7.0 5.0 - 8.0    Protein, UA NEGATIVE NEGATIVE    Urobilinogen, Urine Normal Normal    Nitrite, Urine NEGATIVE NEGATIVE    Leukocyte Esterase, Urine NEGATIVE NEGATIVE    Urinalysis Comments       Microscopic exam not performed based on chemical results unless requested in original order.    Basic Metabolic Panel w/ Reflex to MG    Collection Time: 05/02/19  5:54 AM   Result Value Ref Range    Glucose 100 (H) 70 - 99 mg/dL    BUN 17 8 - 23 mg/dL    CREATININE 0.65 0.50 - 0.90 mg/dL    Bun/Cre Ratio NOT REPORTED 9 - 20    Calcium 9.1 8.6 - 10.4 mg/dL    Sodium 142 135 - 144 mmol/L    Potassium 3.5 (L) 3.7 - 5.3 mmol/L    Chloride 108 (H) 98 - 107 mmol/L    CO2 23 20 - 31 mmol/L    Anion Gap 11 9 - 17 mmol/L    GFR Non-African American >60 >60 mL/min    GFR

## 2019-05-02 NOTE — PROGRESS NOTES
Trauma Tertiary Survey    Admit Date: 5/1/2019  Hospital day 0    [facility]       Past Medical History:   Diagnosis Date    Blood clotting disorder ([address] 4/2011    GERD (gastroesophageal reflux disease) 4/2011    Irregular heartbeat 2008    Menopause 1999       Scheduled Meds:   sodium chloride flush  10 mL Intravenous 2 times per day     Continuous Infusions:  PRN Meds:sodium chloride flush, acetaminophen    Subjective:     Patient has no complaints at this time however, she is pleasantly demented. Objective:     Patient Vitals for the past 8 hrs:   BP Temp Temp src Pulse Resp SpO2   05/01/19 2008 (!) 144/71 98.4 °F (36.9 °C) Oral 85 16 98 %       No intake/output data recorded. No intake/output data recorded.     Radiology:     PHYSICAL EXAM:   GCS:  4 - Opens eyes on own   5 - Withdraw pain   4 - Confused     GCS 13    Pupil size:  Left 3 mm Right 3 mm  Pupil reaction: Yes  Wiggles fingers: Left Yes Right Yes  Hand grasp:   Left normal   Right normal  Wiggles toes: Left Yes    Right Yes  Plantar flexion: Left normal  Right normal        Spine:     Spine Tenderness ROM   Cervical 0/10 Normal   Thoracic 0/10 Normal   Lumbar 0 /10 Normal     Musculoskeletal    Joint Tenderness Swelling ROM   Right shoulder absent absent normal   Left shoulder absent absent normal   Right elbow absent absent normal   Left elbow absent absent normal   Right wrist absent absent normal   Left wrist absent absent normal   Right hand grasp absent absent normal   Left hand grasp absent absent normal   Right hip absent absent normal   Left hip absent absent normal   Right knee absent absent normal   Left knee absent absent normal   Right ankle absent absent normal   Left ankle absent absent normal   Right foot absent absent normal   Left foot absent absent normal           CONSULTS:     PROCEDURES:     INJURIES:      Patient Active Problem List   Diagnosis    GERD (gastroesophageal reflux disease)    Irregular heartbeat    Blood

## 2019-05-02 NOTE — PROGRESS NOTES
IM RESIDENT PROGRESS NOTE        Patient:  Pham Arguello  YOB: 1945    MRN: [id number]     Acct: [de-identified]     Admit date: 5/1/2019    Chief complaints : Fall      Subjective:  Patient seen and examined. Patient was agitated overnight, received a dose of Zyprexa. Afebrile, hemodynamically stable    Review of Systems   Constitutional: Negative for chills, diaphoresis and fever. HENT: Negative for congestion. Respiratory: Negative for cough and shortness of breath. Cardiovascular: Negative for chest pain, palpitations and leg swelling. Gastrointestinal: Negative for abdominal pain, constipation, diarrhea, nausea and vomiting. Genitourinary: Negative for dysuria and frequency. Musculoskeletal: Negative for arthralgias. Skin: Negative for rash and wound. Neurological: Negative for dizziness, seizures, weakness and headaches. Psychiatric/Behavioral: Positive for agitation. Diet:  DIET GENERAL;      Medications:Current Inpatient    Scheduled Meds:   QUEtiapine  25 mg Oral Nightly    sodium chloride flush  10 mL Intravenous 2 times per day    enoxaparin  40 mg Subcutaneous Daily    OXcarbazepine  150 mg Oral BID    rivastigmine  1 patch Transdermal Daily     Continuous Infusions:  PRN Meds:sodium chloride flush, potassium chloride **OR** potassium alternative oral replacement **OR** potassium chloride, magnesium sulfate, magnesium hydroxide, ondansetron, acetaminophen    Objective:    Physical Exam:  Vitals: BP (!) 142/68   Pulse 98   Temp 98 °F (36.7 °C) (Axillary)   Resp 13   Ht 5' 6\" (1.676 m)   Wt 113 lb (51.3 kg)   SpO2 97%   BMI 18.24 kg/m²   24 hour intake/output:No intake or output data in the 24 hours ending 05/02/19 1308  Last 3 weights:   Wt Readings from Last 3 Encounters:   05/02/19 113 lb (51.3 kg)   03/07/19 113 lb (51.3 kg)   06/19/17 138 lb (62.6 kg)       Physical Examination:   General appearance - alert, confused  Mental status - confused, not oriented  Eyes - pupils equal and reactive, extraocular eye movements intact  Ears - bilateral TM's and external ear canals normal  Nose - normal and patent, no erythema, discharge or polyps  Mouth - mucous membranes moist, pharynx normal without lesions  Neck - supple, no significant adenopathy  Chest - clear to auscultation, no wheezes, rales or rhonchi, symmetric air entry  Heart - normal rate, regular rhythm, normal S1, S2, no murmurs, rubs, clicks or gallops  Abdomen - soft, nontender, nondistended, no masses or organomegaly  Neurological - alert, oriented, normal speech, no focal findings or movement disorder noted}  Extremities - peripheral pulses normal, no pedal edema, no clubbing or cyanosis  Skin - normal coloration and turgor, no rashes, no suspicious skin lesions noted     Labs:-    CBC:   Recent Labs     05/01/19  1700 05/02/19  0554   WBC 5.7 6.5   HGB 13.9 14.2    245     BMP:    Recent Labs     05/01/19  1700 05/02/19  0554    142   K 4.5 3.5*   * 108*   CO2 22 23   BUN 28* 17   CREATININE 0.72 0.65   GLUCOSE 121* 100*     Calcium:  Recent Labs     05/02/19  0554   CALCIUM 9.1     Ionized Calcium:No results for input(s): IONCA in the last 72 hours. Magnesium:  Recent Labs     05/02/19  0554   MG 2.4     Phosphorus:No results for input(s): PHOS in the last 72 hours. BNP:No results for input(s): BNP in the last 72 hours. Glucose:No results for input(s): POCGLU in the last 72 hours. HgbA1C: No results for input(s): LABA1C in the last 72 hours. INR:   Recent Labs     05/01/19  1700   INR 1.0     Hepatic: No results for input(s): ALKPHOS, ALT, AST, PROT, BILITOT, BILIDIR, LABALBU in the last 72 hours. Amylase and Lipase:No results for input(s): LACTA, AMYLASE in the last 72 hours. Lactic Acid: No results for input(s): LACTA in the last 72 hours.   CARDIAC ENZYMES:No results for input(s): CKTOTAL, CKMB, CKMBINDEX, TROPONINI in the last 72 hours. BNP: No results for input(s): BNP in the last 72 hours. Lipids: No results for input(s): CHOL, TRIG, HDL, LDLCALC in the last 72 hours. Invalid input(s): LDL  ABGs: No results found for: PH, PCO2, PO2, HCO3, O2SAT  Thyroid:   Lab Results   Component Value Date    TSH 2.51 02/16/2017      Urinalysis:   Color, UA   Date Value Ref Range Status   05/02/2019 YELLOW YELLOW Final     pH, UA   Date Value Ref Range Status   05/02/2019 7.0 5.0 - 8.0 Final     Specific Gravity, UA   Date Value Ref Range Status   05/02/2019 1.011 1.005 - 1.030 Final     Protein, UA   Date Value Ref Range Status   05/02/2019 NEGATIVE NEGATIVE Final     Nitrite, Urine   Date Value Ref Range Status   05/02/2019 NEGATIVE NEGATIVE Final     Leukocyte Esterase, Urine   Date Value Ref Range Status   05/02/2019 NEGATIVE NEGATIVE Final     Glucose, Ur   Date Value Ref Range Status   05/02/2019 NEGATIVE NEGATIVE Final     Bilirubin Urine   Date Value Ref Range Status   05/02/2019 NEGATIVE NEGATIVE Final         Assessment:  Active Problems:    Syncope and collapse    Fall    Lewy body dementia  Resolved Problems:    * No resolved hospital problems. *      Plan: 1. Syncope  H/o of seizure?  On trileptal.  Prolactin elevated  Neurology consulted,F/u on trileptal level,   F/u on ECHO  F/u on orthostatics       Seroquel 25 mg nightly     DVT Prophylaxis: Lovenox  Discharge planning: OT/PT/Social servicereferral        Edna Cabrera MD        PGY-1 Internal Medicine Resident  Nancie Alatorre       5/2/2019, 1:08 PM

## 2019-05-02 NOTE — H&P
[address] [place] [address]     Department of Internal Medicine - Staff Internal Medicine Service          ADMISSION NOTE/HISTORY ANDPHYSICAL EXAMINATION     CHIEF COMPLAINT:     Fall      HISTORY OBTAIN FROM:    Chart review    HISTORY OF PRESENT ILLNESS:         The patient is a pleasant 66 y.o. female with significant past medical history of dementia, confused at baseline, per report presented to the ED after she had a fall. Patient was reportedly standing at the nurses station at her extended care facility when she fell and possibly had seizure-like activity. She was brought in by EMS as a trauma, CTLS has been cleared. In the ED, on examination the patient was confused, she would not answer to any questions. CT head without contrast showed no acute intracranial abnormality, CT cervical spine without contrast- no acute abnormality. CXR- no acute process  Creatinine 0.72, glucose 121, prolactin 41.3, WBC 5.7, VBG- Ph-7.434, pCo2-38.8, HCo3-25.6  Her blood pressure was 144/71, afebrile, saturating at 98% on room air. Trileptal is listed as her home  Medication. PAST MEDICALHISTORY:           Diagnosis Date    Blood clotting disorder ([address] 4/2011    GERD (gastroesophageal reflux disease) 4/2011    Irregular heartbeat 2008    Menopause 1999         PAST SURGICAL HISTORY:           Procedure Laterality Date    COLONOSCOPY  2008     due 2016    [address]    Laparotomy d/t ectopic    SHOULDER SURGERY  2000    SINUS SURGERY  4/16/2011    TONSILLECTOMY  1951         MEDICATION PRIOR TO ADMISSION:     Medications Prior to Admission: rivastigmine (EXELON) 9.5 MG/24HR, Place 1 patch onto the skin daily  OXcarbazepine (TRILEPTAL) 150 MG tablet, Take 150 mg by mouth 2 times daily Indications: Unknown strength per daughter    Allergies:      Other      SOCIAL HISTORY:      Unable to obtain as the patient is in  [facility]      FAMILY HISTORY:          Problem Relation Age of Onset    Heart Attack Father     Stroke Mother          REVIEW OF SYSTEMS:     Review of Systems   Unable to perform ROS: Dementia           PHYSICAL EXAM:   Vitals: BP (!) 144/71   Pulse 85   Temp 98.4 °F (36.9 °C) (Oral)   Resp 16   SpO2 98%     Body weight:   Wt Readings from Last 3 Encounters:   03/07/19 113 lb (51.3 kg)   06/19/17 138 lb (62.6 kg)   04/27/15 140 lb (63.5 kg)       Body Mass Index : There is no height or weight on file to calculate BMI. PHYSICAL EXAMINATION:    Physical Exam   Constitutional: She appears well-developed. HENT:   Head: Normocephalic and atraumatic. Eyes: Pupils are equal, round, and reactive to light. EOM are normal.   Neck: Normal range of motion. Neck supple. Cardiovascular: Normal rate, regular rhythm and normal heart sounds. Pulmonary/Chest: Effort normal and breath sounds normal.   Abdominal: Soft. Bowel sounds are normal.   Musculoskeletal: Normal range of motion. Neurological:   Alert, not oriented   Skin: Skin is warm and dry.          Diagnostic Labs and Imaging    CBC:   Recent Labs     05/01/19  1700   WBC 5.7   RBC 4.67   HGB 13.9   HCT 42.5   MCV 91.0   RDW 12.1        BMP: @LABRCNT(NA:3,K:3,CL:3,CO2:3,PHOS:3,BUN:3,CREATININE:3,CA:3)@  BNP: No results for input(s): BNP in the last 72 hours. PT/INR:   Recent Labs     05/01/19  1700   PROTIME 10.7   INR 1.0     APTT:   Recent Labs     05/01/19  1700   APTT 21.7     CARDIAC ENZYMES: No results for input(s): CKMB, CKMBINDEX, TROPONINI in the last 72 hours. Invalid input(s): CKTOTAL;3  FASTING LIPID PANEL:  Lab Results   Component Value Date    CHOL 243 (H) 03/09/2015    HDL 70 03/09/2015    TRIG 83 03/09/2015     LIVER PROFILE: No results for input(s): AST, ALT, ALB, BILIDIR, BILITOT, ALKPHOS in the last 72 hours.      Ct Head Wo Contrast    Result Date: 5/1/2019  EXAMINATION: CT OF THE HEAD WITHOUT CONTRAST  5/1/2019 4:57 pm TECHNIQUE: CT of the head was performed without the administration of intravenous contrast. Dose modulation, iterative reconstruction, and/or weight based adjustment of the mA/kV was utilized to reduce the radiation dose to as low as reasonably achievable. COMPARISON: None. HISTORY: ORDERING SYSTEM PROVIDED HISTORY: fall, ?seizure TECHNOLOGIST PROVIDED HISTORY: FINDINGS: BRAIN/VENTRICLES: The ventricles and sulci are diffusely enlarged. Low attenuation is seen in the periventricular and subcortical white matter. No acute intracranial hemorrhage or acute infarct is identified. ORBITS: The visualized portion of the orbits demonstrate no acute abnormality. SINUSES: The visualized paranasal sinuses and mastoid air cells demonstrate no acute abnormality. SOFT TISSUES/SKULL:  No acute abnormality of the visualized skull or soft tissues. No acute intracranial abnormality. Ct Cervical Spine Wo Contrast    Result Date: 5/1/2019  EXAMINATION: CT OF THE CERVICAL SPINE WITHOUT CONTRAST 5/1/2019 4:57 pm TECHNIQUE: CT of the cervical spine was performed without the administration of intravenous contrast. Multiplanar reformatted images are provided for review. Dose modulation, iterative reconstruction, and/or weight based adjustment of the mA/kV was utilized to reduce the radiation dose to as low as reasonably achievable. COMPARISON: None. HISTORY: ORDERING SYSTEM PROVIDED HISTORY: fall, ?seizure FINDINGS: BONES/ALIGNMENT: There is no evidence of an acute cervical spine fracture. There is normal alignment of the cervical spine. DEGENERATIVE CHANGES: Degenerative and degenerative disc changes are present C4 through C7 with posterior osteophytic formation and bilateral moderate neural foraminal narrowing C5-C6, C6-C7 with bilateral mild neural foraminal narrowing C4-C5. Moderate right neural foraminal narrowing C3-C4 is evident. Mild bony canal stenosis C4-C5 due to spondylosis. Diffuse mild facet changes are noted.  SOFT TISSUES: There is no prevertebral soft tissue

## 2019-05-02 NOTE — PROGRESS NOTES
Pt woke up very restless, agitated, was kicking and punching nursing staff. Security came. Medicine resident notified and came up to assess pt. Bilateral arms and leg restraints applied. Will continue to monitor.

## 2019-05-03 VITALS
WEIGHT: 113 LBS | OXYGEN SATURATION: 97 % | HEART RATE: 89 BPM | HEIGHT: 66 IN | TEMPERATURE: 98.6 F | BODY MASS INDEX: 18.16 KG/M2 | SYSTOLIC BLOOD PRESSURE: 128 MMHG | RESPIRATION RATE: 16 BRPM | DIASTOLIC BLOOD PRESSURE: 75 MMHG

## 2019-05-03 LAB
ABSOLUTE EOS #: 0.06 K/UL (ref 0–0.44)
ABSOLUTE IMMATURE GRANULOCYTE: <0.03 K/UL (ref 0–0.3)
ABSOLUTE LYMPH #: 2.22 K/UL (ref 1.1–3.7)
ABSOLUTE MONO #: 0.66 K/UL (ref 0.1–1.2)
ANION GAP SERPL CALCULATED.3IONS-SCNC: 9 MMOL/L (ref 9–17)
BASOPHILS # BLD: 0 % (ref 0–2)
BASOPHILS ABSOLUTE: <0.03 K/UL (ref 0–0.2)
BUN BLDV-MCNC: 14 MG/DL (ref 8–23)
BUN/CREAT BLD: ABNORMAL (ref 9–20)
CALCIUM SERPL-MCNC: 9.3 MG/DL (ref 8.6–10.4)
CHLORIDE BLD-SCNC: 107 MMOL/L (ref 98–107)
CO2: 24 MMOL/L (ref 20–31)
CREAT SERPL-MCNC: 0.61 MG/DL (ref 0.5–0.9)
DIFFERENTIAL TYPE: NORMAL
EOSINOPHILS RELATIVE PERCENT: 1 % (ref 1–4)
GFR AFRICAN AMERICAN: >60 ML/MIN
GFR NON-AFRICAN AMERICAN: >60 ML/MIN
GFR SERPL CREATININE-BSD FRML MDRD: ABNORMAL ML/MIN/{1.73_M2}
GFR SERPL CREATININE-BSD FRML MDRD: ABNORMAL ML/MIN/{1.73_M2}
GLUCOSE BLD-MCNC: 102 MG/DL (ref 70–99)
HCT VFR BLD CALC: 44.2 % (ref 36.3–47.1)
HEMOGLOBIN: 14.6 G/DL (ref 11.9–15.1)
IMMATURE GRANULOCYTES: 0 %
LV EF: 58 %
LVEF MODALITY: NORMAL
LYMPHOCYTES # BLD: 39 % (ref 24–43)
MCH RBC QN AUTO: 30 PG (ref 25.2–33.5)
MCHC RBC AUTO-ENTMCNC: 33 G/DL (ref 28.4–34.8)
MCV RBC AUTO: 90.8 FL (ref 82.6–102.9)
MONOCYTES # BLD: 12 % (ref 3–12)
NRBC AUTOMATED: 0 PER 100 WBC
PDW BLD-RTO: 11.9 % (ref 11.8–14.4)
PLATELET # BLD: 244 K/UL (ref 138–453)
PLATELET ESTIMATE: NORMAL
PMV BLD AUTO: 9.1 FL (ref 8.1–13.5)
POTASSIUM SERPL-SCNC: 3.9 MMOL/L (ref 3.7–5.3)
RBC # BLD: 4.87 M/UL (ref 3.95–5.11)
RBC # BLD: NORMAL 10*6/UL
SEG NEUTROPHILS: 48 % (ref 36–65)
SEGMENTED NEUTROPHILS ABSOLUTE COUNT: 2.66 K/UL (ref 1.5–8.1)
SODIUM BLD-SCNC: 140 MMOL/L (ref 135–144)
WBC # BLD: 5.6 K/UL (ref 3.5–11.3)
WBC # BLD: NORMAL 10*3/UL

## 2019-05-03 PROCEDURE — 93308 TTE F-UP OR LMTD: CPT

## 2019-05-03 PROCEDURE — 93325 DOPPLER ECHO COLOR FLOW MAPG: CPT

## 2019-05-03 PROCEDURE — 51798 US URINE CAPACITY MEASURE: CPT

## 2019-05-03 PROCEDURE — 6370000000 HC RX 637 (ALT 250 FOR IP): Performed by: PSYCHIATRY & NEUROLOGY

## 2019-05-03 PROCEDURE — 80048 BASIC METABOLIC PNL TOTAL CA: CPT

## 2019-05-03 PROCEDURE — 2580000003 HC RX 258: Performed by: STUDENT IN AN ORGANIZED HEALTH CARE EDUCATION/TRAINING PROGRAM

## 2019-05-03 PROCEDURE — 6360000002 HC RX W HCPCS: Performed by: STUDENT IN AN ORGANIZED HEALTH CARE EDUCATION/TRAINING PROGRAM

## 2019-05-03 PROCEDURE — 99239 HOSP IP/OBS DSCHRG MGMT >30: CPT | Performed by: INTERNAL MEDICINE

## 2019-05-03 PROCEDURE — 85025 COMPLETE CBC W/AUTO DIFF WBC: CPT

## 2019-05-03 PROCEDURE — 36415 COLL VENOUS BLD VENIPUNCTURE: CPT

## 2019-05-03 PROCEDURE — 99232 SBSQ HOSP IP/OBS MODERATE 35: CPT | Performed by: PSYCHIATRY & NEUROLOGY

## 2019-05-03 PROCEDURE — 94760 N-INVAS EAR/PLS OXIMETRY 1: CPT

## 2019-05-03 PROCEDURE — 6370000000 HC RX 637 (ALT 250 FOR IP): Performed by: STUDENT IN AN ORGANIZED HEALTH CARE EDUCATION/TRAINING PROGRAM

## 2019-05-03 RX ORDER — MEMANTINE HYDROCHLORIDE 5 MG/1
5 TABLET ORAL 2 TIMES DAILY
Qty: 60 TABLET | Refills: 3 | Status: SHIPPED | OUTPATIENT
Start: 2019-05-03

## 2019-05-03 RX ORDER — OXCARBAZEPINE 150 MG/1
150 TABLET, FILM COATED ORAL 2 TIMES DAILY
Qty: 60 TABLET | Refills: 3 | Status: SHIPPED | OUTPATIENT
Start: 2019-05-03

## 2019-05-03 RX ADMIN — Medication 10 ML: at 09:25

## 2019-05-03 RX ADMIN — ENOXAPARIN SODIUM 40 MG: 40 INJECTION SUBCUTANEOUS at 09:24

## 2019-05-03 RX ADMIN — MEMANTINE HYDROCHLORIDE 5 MG: 5 TABLET, FILM COATED ORAL at 09:24

## 2019-05-03 RX ADMIN — OXCARBAZEPINE 150 MG: 150 TABLET, FILM COATED ORAL at 09:24

## 2019-05-03 ASSESSMENT — ENCOUNTER SYMPTOMS
CONSTIPATION: 0
DIARRHEA: 0
COUGH: 0
NAUSEA: 0
ABDOMINAL PAIN: 0
VOMITING: 0
SHORTNESS OF BREATH: 0

## 2019-05-03 ASSESSMENT — PAIN SCALES - GENERAL: PAINLEVEL_OUTOF10: 0

## 2019-05-03 NOTE — PROGRESS NOTES
NEUROLOGY INPATIENT PROGRESS NOTE    5/3/2019         Subjective: Florencia Gonzalez is a  66 y.o. female admitted on 5/1/2019 with Syncope and collapse [R55]  Fall [W19. XXXA]    Briefly, this is a  66 y.o. female admitted on 5/1/2019 with    Today doing well. Remains seizure free. EEG negative for epileptiform activity. B12 level WNL. No new focal neurological deficits. Medication recommendations in plan below. Can be discharged from neuro standpoint. No current facility-administered medications on file prior to encounter. Current Outpatient Medications on File Prior to Encounter   Medication Sig Dispense Refill    busPIRone (BUSPAR) 10 MG tablet Take 10 mg by mouth 2 times daily      rivastigmine (EXELON) 9.5 MG/24HR Place 1 patch onto the skin daily         Allergies: Florencia Gonzalez is allergic to other.     Past Medical History:   Diagnosis Date    Blood clotting disorder ([address] 4/2011    GERD (gastroesophageal reflux disease) 4/2011    Irregular heartbeat 2008    Menopause 1999       Past Surgical History:   Procedure Laterality Date    COLONOSCOPY  2008     due 2016    [address]    Laparotomy d/t ectopic    SHOULDER SURGERY  2000    SINUS SURGERY  4/16/2011    TONSILLECTOMY  1951       Medications:     QUEtiapine  25 mg Oral Nightly    memantine  5 mg Oral BID    sodium chloride flush  10 mL Intravenous 2 times per day    enoxaparin  40 mg Subcutaneous Daily    OXcarbazepine  150 mg Oral BID    rivastigmine  1 patch Transdermal Daily     PRN Meds include: sodium chloride flush, potassium chloride **OR** potassium alternative oral replacement **OR** potassium chloride, magnesium sulfate, magnesium hydroxide, ondansetron, acetaminophen    Objective:   /68   Pulse 76   Temp 98.1 °F (36.7 °C) (Oral)   Resp 16   Ht 5' 6\" (1.676 m)   Wt 113 lb (51.3 kg)   SpO2 97%   BMI 18.24 kg/m²     Blood pressure range: Systolic (96UXP), SDJ:355 , Min:128 , HGA:514   ; Diastolic (79ROW), NNF:70, Min:68, Max:79      ROS:  CONSTITUTIONAL: negative for fatigue and malaise   EYES: negative for double vision and photophobia    HEENT: negative for tinnitus and sore throat   RESPIRATORY: negative for cough, shortness of breath   CARDIOVASCULAR: negative for chest pain, palpitations, or syncope   GASTROINTESTINAL: negative for abdominal pain, nausea, vomiting, diarrhea, or constipation    GENITOURINARY: negative for incontinence or retention    MUSCULOSKELETAL: negative for neck or back pain, negative for extremity pain   NEUROLOGICAL: Negative for seizures, headaches, weakness, numbness, confusion, aphasia, dysarthria   PSYCHIATRIC: negative for agitation, hallucination, SI/HI   SKIN Negative for spontaneous contusions, rashes, or lesions        NEUROLOGIC EXAMINATION  GENERAL  Appears comfortable and in no distress   HEENT  NC/ AT   HEART  S1 and S2 heard; palpation of pulses: radial pulse    NECK  Supple and no bruits heard   MENTAL STATUS:  Alert, oriented, intact memory, no confusion, normal speech, normal language, no hallucination or delusion   CRANIAL NERVES: II     -      Visual fields intact to confrontation  III,IV,VI -  PERR, EOMs full, no ptosis  V     -     Normal facial sensation   VII    -     Normal facial symmetry  VIII   -     Intact hearing   IX,X -     Symmetrical palate  XI    -     Symmetrical shoulder shrug  XII   -     Midline tongue, no atrophy    MOTOR FUNCTION: RUE: Significant for good strength of grade 5/5 in proximal and distal muscle groups   LUE: Significant for good strength of grade 5/5 in proximal and distal muscle groups   RLE: Significant for good strength of grade 5/5 in proximal and distal muscle groups   LLE: Significant for good strength of grade 5/5 in proximal and distal muscle groups      Normal bulk, normal tone and no involuntary movements, no tremor   SENSORY FUNCTION:  Normal touch, normal pin, normal vibration, normal

## 2019-05-03 NOTE — PROGRESS NOTES
IM RESIDENT PROGRESS NOTE        Patient:  Kiki Allan  YOB: 1945    MRN: [id number]     Acct: [de-identified]     Admit date: 5/1/2019    Chief complaints :    Pt seen and Chart reviewed. Subjective:   Patient seen and examined. The patient is alert, oriented to self, at baseline. The patient slept well overnight. Afebrile, hemodynamically stable  Patient had EEG done yesterday which showed no focal epileptic activity, Namenda 5 mg BID was added by neurology. Patient went for echo this morning, could not complete echo due to patient's noncompliance during the procedure      Review of Systems   Constitutional: Negative for chills, diaphoresis and fever. HENT: Negative for congestion. Respiratory: Negative for cough and shortness of breath. Cardiovascular: Negative for chest pain, palpitations and leg swelling. Gastrointestinal: Negative for abdominal pain, constipation, diarrhea, nausea and vomiting. Genitourinary: Negative for dysuria and frequency. Musculoskeletal: Negative for arthralgias. Skin: Negative for rash and wound. Neurological: Negative for dizziness, weakness and headaches. Psychiatric/Behavioral: Positive for confusion.            Diet:  DIET GENERAL;      Medications:Current Inpatient    Scheduled Meds:   QUEtiapine  25 mg Oral Nightly    memantine  5 mg Oral BID    sodium chloride flush  10 mL Intravenous 2 times per day    enoxaparin  40 mg Subcutaneous Daily    OXcarbazepine  150 mg Oral BID    rivastigmine  1 patch Transdermal Daily     Continuous Infusions:  PRN Meds:sodium chloride flush, potassium chloride **OR** potassium alternative oral replacement **OR** potassium chloride, magnesium sulfate, magnesium hydroxide, ondansetron, acetaminophen    Objective:    Physical Exam:  Vitals: /67   Pulse 82   Temp 98.1 °F (36.7 °C)   Resp 18   Ht 5' 6\" (1.676 m)   Wt 113 lb (51.3 kg)   SpO2 95%   BMI 18.24 kg/m²   24 hour intake/output:    Intake/Output Summary (Last 24 hours) at 5/3/2019 1139  Last data filed at 5/3/2019 0500  Gross per 24 hour   Intake 250 ml   Output --   Net 250 ml     Last 3 weights: Wt Readings from Last 3 Encounters:   05/02/19 113 lb (51.3 kg)   03/07/19 113 lb (51.3 kg)   06/19/17 138 lb (62.6 kg)       Physical Examination:   General appearance - alert, well appearing, and in no distress  Mental status - alert, oriented to self, not to place and time   Nose - normal and patent, no erythema, discharge or polyps  Mouth - mucous membranes moist, pharynx normal without lesions  Neck - supple, no significant adenopathy  Chest - clear to auscultation, no wheezes, rales or rhonchi, symmetric air entry  Heart - normal rate, regular rhythm, normal S1, S2, no murmurs, rubs, clicks or gallops  Abdomen - soft, nontender, nondistended, no masses or organomegaly  Neurological - alert,normal speech, no focal findings or movement disorder noted}  Extremities - peripheral pulses normal, no pedal edema, no clubbing or cyanosis  Skin - normal coloration and turgor, no rashes, no suspicious skin lesions noted     Labs:-    CBC:   Recent Labs     05/01/19  1700 05/02/19  0554 05/03/19  0608   WBC 5.7 6.5 5.6   HGB 13.9 14.2 14.6    245 244     BMP:    Recent Labs     05/01/19  1700 05/02/19  0554 05/03/19  0608    142 140   K 4.5 3.5* 3.9   * 108* 107   CO2 22 23 24   BUN 28* 17 14   CREATININE 0.72 0.65 0.61   GLUCOSE 121* 100* 102*     Calcium:  Recent Labs     05/03/19  0608   CALCIUM 9.3     Ionized Calcium:No results for input(s): IONCA in the last 72 hours. Magnesium:  Recent Labs     05/02/19  0554   MG 2.4     Phosphorus:No results for input(s): PHOS in the last 72 hours. BNP:No results for input(s): BNP in the last 72 hours. Glucose:No results for input(s): POCGLU in the last 72 hours.   HgbA1C: No results for input(s): LABA1C in the last 72 hours.  INR:   Recent Labs     05/01/19  1700   INR 1.0     Hepatic: No results for input(s): ALKPHOS, ALT, AST, PROT, BILITOT, BILIDIR, LABALBU in the last 72 hours. Amylase and Lipase:No results for input(s): LACTA, AMYLASE in the last 72 hours. Lactic Acid: No results for input(s): LACTA in the last 72 hours. CARDIAC ENZYMES:No results for input(s): CKTOTAL, CKMB, CKMBINDEX, TROPONINI in the last 72 hours. BNP: No results for input(s): BNP in the last 72 hours. Lipids: No results for input(s): CHOL, TRIG, HDL, LDLCALC in the last 72 hours. Invalid input(s): LDL  ABGs: No results found for: PH, PCO2, PO2, HCO3, O2SAT  Thyroid:   Lab Results   Component Value Date    TSH 2.51 02/16/2017      Urinalysis:   Color, UA   Date Value Ref Range Status   05/02/2019 YELLOW YELLOW Final     pH, UA   Date Value Ref Range Status   05/02/2019 7.0 5.0 - 8.0 Final     Specific Gravity, UA   Date Value Ref Range Status   05/02/2019 1.011 1.005 - 1.030 Final     Protein, UA   Date Value Ref Range Status   05/02/2019 NEGATIVE NEGATIVE Final     Nitrite, Urine   Date Value Ref Range Status   05/02/2019 NEGATIVE NEGATIVE Final     Leukocyte Esterase, Urine   Date Value Ref Range Status   05/02/2019 NEGATIVE NEGATIVE Final     Glucose, Ur   Date Value Ref Range Status   05/02/2019 NEGATIVE NEGATIVE Final     Bilirubin Urine   Date Value Ref Range Status   05/02/2019 NEGATIVE NEGATIVE Final         Assessment:  Active Problems:    Syncope and collapse    Fall    Lewy body dementia    Seizure-like activity ([facility] [address]  Resolved Problems:    * No resolved hospital problems. *      Plan: 1. Syncope  Patient had EEG done yesterday which showed no focal epileptic activity, Namenda 5 mg BID was added by neurology.   Continue Trileptal and Exelon patch  Patient went for echo this morning, could not complete echo due to patient's noncompliance during the procedure.    Cara Gale noted   DVT Prophylaxis: Lovenox  Discharge planning: Patient to be

## 2019-05-03 NOTE — PROGRESS NOTES
Limited Echo done in the Echo Lab. Unable to do complete echo due to patients noncompliance during this procedure. Lord Robert

## 2019-05-04 LAB — OXCARBAZEPINE: <1 UG/ML (ref 3–35)

## 2019-05-04 NOTE — PLAN OF CARE
Problem: Restraint Use - Nonviolent/Non-Self-Destructive Behavior:  Goal: Absence of restraint indications  Description  Absence of restraint indications  5/3/2019 2134 by Isadora Walker RN  Outcome: Completed  5/3/2019 1817 by Rory Chan RN  Outcome: Ongoing  Goal: Absence of restraint-related injury  Description  Absence of restraint-related injury  5/3/2019 2134 by Isadora Walker RN  Outcome: Completed  5/3/2019 1817 by Rory Chan RN  Outcome: Ongoing     Problem: Falls - Risk of:  Goal: Will remain free from falls  Description  Will remain free from falls  5/3/2019 2134 by Isadora Walker RN  Outcome: Completed  5/3/2019 1817 by Rory Chan RN  Outcome: Ongoing  Goal: Absence of physical injury  Description  Absence of physical injury  5/3/2019 2134 by Isadora Walker RN  Outcome: Completed  5/3/2019 1817 by Rory Chan RN  Outcome: Ongoing     Problem: Risk for Impaired Skin Integrity  Goal: Tissue integrity - skin and mucous membranes  Description  Structural intactness and normal physiological function of skin and  mucous membranes.   5/3/2019 2134 by Isadora Walker RN  Outcome: Completed  5/3/2019 1817 by Rory Chan RN  Outcome: Ongoing     Problem: Pain:  Goal: Pain level will decrease  Description  Pain level will decrease  5/3/2019 2134 by Isadora Walker RN  Outcome: Completed  5/3/2019 1817 by Rory Chan RN  Outcome: Ongoing  Goal: Control of acute pain  Description  Control of acute pain  5/3/2019 2134 by Isadora Walker RN  Outcome: Completed  5/3/2019 1817 by Rory Chan RN  Outcome: Ongoing  Goal: Control of chronic pain  Description  Control of chronic pain  5/3/2019 2134 by Isadora Walker RN  Outcome: Completed  5/3/2019 1817 by Rory Chan RN  Outcome: Ongoing

## 2019-05-15 NOTE — DISCHARGE SUMMARY
Unknown strength per daughter, Disp-60 tablet, R-3Normal         CONTINUE these medications which have NOT CHANGED    Details   busPIRone (BUSPAR) 10 MG tablet Take 10 mg by mouth 2 times dailyHistorical Med      rivastigmine (EXELON) 9.5 MG/24HR Place 1 patch onto the skin dailyHistorical Med             Activity: activity as tolerated    Diet: regular diet    Follow-up:    Debria Nageotte, APRN - CNP  [address] [address] [phone]            Follow up labs: none    Follow up imaging: none    Note that over 30 minutes was spent in preparing discharge papers, discussing discharge with patient, medication review, etc.      Masood Oden MD         Department of Internal Medicine  [facility]         5/15/2019, 10:37 AM
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
## 2019-06-27 ENCOUNTER — APPOINTMENT (OUTPATIENT)
Dept: CT IMAGING | Age: 74
DRG: 470 | End: 2019-06-27
Payer: COMMERCIAL

## 2019-06-27 ENCOUNTER — APPOINTMENT (OUTPATIENT)
Dept: GENERAL RADIOLOGY | Age: 74
DRG: 470 | End: 2019-06-27
Payer: COMMERCIAL

## 2019-06-27 ENCOUNTER — HOSPITAL ENCOUNTER (INPATIENT)
Age: 74
LOS: 6 days | Discharge: NURSING, REHAB OR LONG-TERM CARE | DRG: 470 | End: 2019-07-03
Attending: EMERGENCY MEDICINE | Admitting: ORTHOPAEDIC SURGERY
Payer: COMMERCIAL

## 2019-06-27 DIAGNOSIS — S72.002A CLOSED FRACTURE OF LEFT HIP, INITIAL ENCOUNTER (HCC): Primary | ICD-10-CM

## 2019-06-27 LAB
ABSOLUTE EOS #: 0 K/UL (ref 0–0.4)
ABSOLUTE IMMATURE GRANULOCYTE: ABNORMAL K/UL (ref 0–0.3)
ABSOLUTE LYMPH #: 0.8 K/UL (ref 1–4.8)
ABSOLUTE MONO #: 0.8 K/UL (ref 0.1–1.3)
ANION GAP SERPL CALCULATED.3IONS-SCNC: 12 MMOL/L (ref 9–17)
BASOPHILS # BLD: 0 % (ref 0–2)
BASOPHILS ABSOLUTE: 0 K/UL (ref 0–0.2)
BUN BLDV-MCNC: 11 MG/DL (ref 8–23)
BUN/CREAT BLD: ABNORMAL (ref 9–20)
CALCIUM SERPL-MCNC: 9 MG/DL (ref 8.6–10.4)
CHLORIDE BLD-SCNC: 104 MMOL/L (ref 98–107)
CO2: 22 MMOL/L (ref 20–31)
CREAT SERPL-MCNC: 0.46 MG/DL (ref 0.5–0.9)
DIFFERENTIAL TYPE: ABNORMAL
EOSINOPHILS RELATIVE PERCENT: 0 % (ref 0–4)
GFR AFRICAN AMERICAN: >60 ML/MIN
GFR NON-AFRICAN AMERICAN: >60 ML/MIN
GFR SERPL CREATININE-BSD FRML MDRD: ABNORMAL ML/MIN/{1.73_M2}
GFR SERPL CREATININE-BSD FRML MDRD: ABNORMAL ML/MIN/{1.73_M2}
GLUCOSE BLD-MCNC: 118 MG/DL (ref 70–99)
HCT VFR BLD CALC: 42.7 % (ref 36–46)
HEMOGLOBIN: 14.4 G/DL (ref 12–16)
IMMATURE GRANULOCYTES: ABNORMAL %
LYMPHOCYTES # BLD: 7 % (ref 24–44)
MCH RBC QN AUTO: 30.1 PG (ref 26–34)
MCHC RBC AUTO-ENTMCNC: 33.7 G/DL (ref 31–37)
MCV RBC AUTO: 89.5 FL (ref 80–100)
MONOCYTES # BLD: 7 % (ref 1–7)
NRBC AUTOMATED: ABNORMAL PER 100 WBC
PDW BLD-RTO: 12.5 % (ref 11.5–14.9)
PLATELET # BLD: 240 K/UL (ref 150–450)
PLATELET ESTIMATE: ABNORMAL
PMV BLD AUTO: 7.4 FL (ref 6–12)
POTASSIUM SERPL-SCNC: 3.8 MMOL/L (ref 3.7–5.3)
RBC # BLD: 4.77 M/UL (ref 4–5.2)
RBC # BLD: ABNORMAL 10*6/UL
SEG NEUTROPHILS: 86 % (ref 36–66)
SEGMENTED NEUTROPHILS ABSOLUTE COUNT: 9.9 K/UL (ref 1.3–9.1)
SODIUM BLD-SCNC: 138 MMOL/L (ref 135–144)
WBC # BLD: 11.6 K/UL (ref 3.5–11)
WBC # BLD: ABNORMAL 10*3/UL

## 2019-06-27 PROCEDURE — 99285 EMERGENCY DEPT VISIT HI MDM: CPT

## 2019-06-27 PROCEDURE — 73502 X-RAY EXAM HIP UNI 2-3 VIEWS: CPT

## 2019-06-27 PROCEDURE — 72125 CT NECK SPINE W/O DYE: CPT

## 2019-06-27 PROCEDURE — 6370000000 HC RX 637 (ALT 250 FOR IP): Performed by: INTERNAL MEDICINE

## 2019-06-27 PROCEDURE — 93005 ELECTROCARDIOGRAM TRACING: CPT | Performed by: INTERNAL MEDICINE

## 2019-06-27 PROCEDURE — 2060000000 HC ICU INTERMEDIATE R&B

## 2019-06-27 PROCEDURE — 70450 CT HEAD/BRAIN W/O DYE: CPT

## 2019-06-27 PROCEDURE — 71045 X-RAY EXAM CHEST 1 VIEW: CPT

## 2019-06-27 PROCEDURE — 6360000002 HC RX W HCPCS: Performed by: ORTHOPAEDIC SURGERY

## 2019-06-27 PROCEDURE — 99221 1ST HOSP IP/OBS SF/LOW 40: CPT | Performed by: ORTHOPAEDIC SURGERY

## 2019-06-27 PROCEDURE — 2580000003 HC RX 258: Performed by: INTERNAL MEDICINE

## 2019-06-27 PROCEDURE — 36415 COLL VENOUS BLD VENIPUNCTURE: CPT

## 2019-06-27 PROCEDURE — 85025 COMPLETE CBC W/AUTO DIFF WBC: CPT

## 2019-06-27 PROCEDURE — 80048 BASIC METABOLIC PNL TOTAL CA: CPT

## 2019-06-27 RX ORDER — OXCARBAZEPINE 150 MG/1
150 TABLET, FILM COATED ORAL 2 TIMES DAILY
Status: DISCONTINUED | OUTPATIENT
Start: 2019-06-27 | End: 2019-07-03 | Stop reason: HOSPADM

## 2019-06-27 RX ORDER — ACETAMINOPHEN 325 MG/1
650 TABLET ORAL EVERY 4 HOURS PRN
Status: DISCONTINUED | OUTPATIENT
Start: 2019-06-27 | End: 2019-07-02

## 2019-06-27 RX ORDER — RIVASTIGMINE 9.5 MG/24H
1 PATCH, EXTENDED RELEASE TRANSDERMAL DAILY
Status: DISCONTINUED | OUTPATIENT
Start: 2019-06-27 | End: 2019-07-03 | Stop reason: HOSPADM

## 2019-06-27 RX ORDER — RISPERIDONE 1 MG/1
1 TABLET, FILM COATED ORAL 2 TIMES DAILY
COMMUNITY

## 2019-06-27 RX ORDER — MEMANTINE HYDROCHLORIDE 5 MG/1
5 TABLET ORAL 2 TIMES DAILY
Status: DISCONTINUED | OUTPATIENT
Start: 2019-06-27 | End: 2019-07-03 | Stop reason: HOSPADM

## 2019-06-27 RX ORDER — BUSPIRONE HYDROCHLORIDE 10 MG/1
10 TABLET ORAL 2 TIMES DAILY
Status: DISCONTINUED | OUTPATIENT
Start: 2019-06-27 | End: 2019-07-03 | Stop reason: HOSPADM

## 2019-06-27 RX ORDER — RISPERIDONE 1 MG/1
1 TABLET, FILM COATED ORAL 2 TIMES DAILY
Status: DISCONTINUED | OUTPATIENT
Start: 2019-06-27 | End: 2019-07-03 | Stop reason: HOSPADM

## 2019-06-27 RX ORDER — SODIUM CHLORIDE 0.9 % (FLUSH) 0.9 %
10 SYRINGE (ML) INJECTION PRN
Status: DISCONTINUED | OUTPATIENT
Start: 2019-06-27 | End: 2019-06-30

## 2019-06-27 RX ORDER — SODIUM CHLORIDE 9 MG/ML
INJECTION, SOLUTION INTRAVENOUS CONTINUOUS
Status: DISCONTINUED | OUTPATIENT
Start: 2019-06-27 | End: 2019-07-03

## 2019-06-27 RX ORDER — SODIUM CHLORIDE 0.9 % (FLUSH) 0.9 %
10 SYRINGE (ML) INJECTION EVERY 12 HOURS SCHEDULED
Status: DISCONTINUED | OUTPATIENT
Start: 2019-06-27 | End: 2019-06-30

## 2019-06-27 RX ORDER — LORATADINE 10 MG/1
10 TABLET ORAL DAILY PRN
COMMUNITY

## 2019-06-27 RX ADMIN — ENOXAPARIN SODIUM 40 MG: 40 INJECTION SUBCUTANEOUS at 16:25

## 2019-06-27 RX ADMIN — SODIUM CHLORIDE: 9 INJECTION, SOLUTION INTRAVENOUS at 18:12

## 2019-06-27 ASSESSMENT — PAIN DESCRIPTION - PAIN TYPE: TYPE: ACUTE PAIN

## 2019-06-27 ASSESSMENT — PAIN DESCRIPTION - ORIENTATION: ORIENTATION: LEFT

## 2019-06-27 ASSESSMENT — PAIN DESCRIPTION - DESCRIPTORS: DESCRIPTORS: TENDER

## 2019-06-27 ASSESSMENT — PAIN DESCRIPTION - LOCATION: LOCATION: HIP

## 2019-06-27 ASSESSMENT — PAIN DESCRIPTION - FREQUENCY: FREQUENCY: CONTINUOUS

## 2019-06-27 ASSESSMENT — PAIN SCALES - GENERAL: PAINLEVEL_OUTOF10: 6

## 2019-06-27 NOTE — ED PROVIDER NOTES
that the pt is positive for a left hip fracture. Xray taken in the nursing home, ER wants a better xray here. Acuity: Acute Type of Exam: Initial FINDINGS: Fracture of the left femoral neck with mild proximal migration of the distal fracture fragment. Overlying soft tissues are otherwise unremarkable. Mildly displaced femoral neck fracture. Ct Head Wo Contrast    Result Date: 6/27/2019  EXAMINATION: CT OF THE HEAD WITHOUT CONTRAST  6/27/2019 1:49 pm TECHNIQUE: CT of the head was performed without the administration of intravenous contrast. Dose modulation, iterative reconstruction, and/or weight based adjustment of the mA/kV was utilized to reduce the radiation dose to as low as reasonably achievable. COMPARISON: CT brain performed 05/01/2019. HISTORY: ORDERING SYSTEM PROVIDED HISTORY: Fall TECHNOLOGIST PROVIDED HISTORY: Ordering Physician Provided Reason for Exam: ams, delirious/? Fall Acuity: Unknown Type of Exam: Unknown FINDINGS: BRAIN/VENTRICLES: There is no acute intracranial hemorrhage, mass effect, or midline shift. There is satisfactory overall gray-white matter differentiation. There is age-appropriate cerebral volume loss. The ventricular structures are symmetric and unremarkable. The infratentorial structures are unremarkable. ORBITS: The visualized portion of the orbits demonstrate no acute abnormality. SINUSES: The visualized paranasal sinuses and mastoid air cells demonstrate no acute abnormality. SOFT TISSUES/SKULL:  No acute abnormality of the visualized skull or soft tissues. Age-appropriate cerebral volume loss without acute intracranial abnormality. Ct Cervical Spine Wo Contrast    Result Date: 6/27/2019  EXAMINATION: CT OF THE CERVICAL SPINE WITHOUT CONTRAST 6/27/2019 1:49 pm TECHNIQUE: CT of the cervical spine was performed without the administration of intravenous contrast. Multiplanar reformatted images are provided for review.  Dose modulation, iterative reconstruction,

## 2019-06-27 NOTE — ED NOTES
Bed: 05  Expected date:   Expected time:   Means of arrival:   Comments:  Tran 11, QUYNH  06/27/19 8330

## 2019-06-28 ENCOUNTER — ANESTHESIA EVENT (OUTPATIENT)
Dept: OPERATING ROOM | Age: 74
DRG: 470 | End: 2019-06-28
Payer: COMMERCIAL

## 2019-06-28 LAB
EKG ATRIAL RATE: 122 BPM
EKG P AXIS: 73 DEGREES
EKG P-R INTERVAL: 138 MS
EKG Q-T INTERVAL: 306 MS
EKG QRS DURATION: 64 MS
EKG QTC CALCULATION (BAZETT): 436 MS
EKG R AXIS: 68 DEGREES
EKG T AXIS: 64 DEGREES
EKG VENTRICULAR RATE: 122 BPM

## 2019-06-28 PROCEDURE — 2060000000 HC ICU INTERMEDIATE R&B

## 2019-06-28 PROCEDURE — 93010 ELECTROCARDIOGRAM REPORT: CPT | Performed by: INTERNAL MEDICINE

## 2019-06-28 PROCEDURE — 99024 POSTOP FOLLOW-UP VISIT: CPT | Performed by: ORTHOPAEDIC SURGERY

## 2019-06-28 PROCEDURE — 6370000000 HC RX 637 (ALT 250 FOR IP): Performed by: INTERNAL MEDICINE

## 2019-06-28 PROCEDURE — 99222 1ST HOSP IP/OBS MODERATE 55: CPT | Performed by: INTERNAL MEDICINE

## 2019-06-28 NOTE — FLOWSHEET NOTE
06/28/19 9720   Family/Significant Other Communication   Family/Significant Other Update Updated;Called   Nurse called patient's daughter, Emily Oakley, and updated her that surgery is scheduled for Sunday at [address] [place] [address]. Nurse updated her on patient condition. Daughter will be in to see patient tomorrow.

## 2019-06-28 NOTE — PROGRESS NOTES
Patient not examined today but chart reviewed. Patient also discussed per Dr. Edna Rivas myself. Dr. Edna Rivas saw the patient earlier and he is presently on his way out of town. He asked me to resume care since I am on-call this weekend    Patient has a displaced left femoral neck fracture which would require a hemiarthroplasty. There is reported the patient has significant dementia and the daughter was already agreed with surgical intervention. Over the phone consultation will be required    Unfortunately the surgical blocktime on Saturday Saturday is Dagoberto booked well into the mid afternoon.   Therefore obtain the liberty to order the patient's Concerta to be scheduled on the morning of 630 at 9 AM for left femoral ER hemiarthroplasty

## 2019-06-28 NOTE — CARE COORDINATION
CALI called [facility] liaison for [facility] and she reported that this patient is a long term care patient and is a bed hold under the Medicaid portion of her insurance. CALI leaned that since she had a fracture if she needs skilled services the facility will need to at least start a pre- cert but it will not hold up DC when the patient is ready. SW following.

## 2019-06-28 NOTE — ANESTHESIA PRE PROCEDURE
Department of Anesthesiology  Preprocedure Note       Name:  Chan Allen   Age:  66 y.o.  :  1945                                          MRN:  [id number]         Date:  2019      Surgeon: Miriam Causey):  Elizabeth Linn MD    Procedure: HIP HEMIARTHROPLASTY (Left Hip)    Medications prior to admission:   Prior to Admission medications    Medication Sig Start Date End Date Taking?  Authorizing Provider   loratadine (CLARITIN) 10 MG tablet Take 10 mg by mouth daily as needed (allergies)   Yes Historical Provider, MD   risperiDONE (RISPERDAL) 1 MG tablet Take 1 mg by mouth 2 times daily   Yes Historical Provider, MD   OXcarbazepine (TRILEPTAL) 150 MG tablet Take 1 tablet by mouth 2 times daily Indications: Unknown strength per daughter 5/3/19  Yes Wendy Flores MD   memantine (NAMENDA) 5 MG tablet Take 1 tablet by mouth 2 times daily 5/3/19  Yes Wendy Flores MD   busPIRone (BUSPAR) 10 MG tablet Take 10 mg by mouth 2 times daily   Yes Historical Provider, MD   rivastigmine (EXELON) 9.5 MG/24HR Place 1 patch onto the skin daily   Yes García Prajapati MD       Current medications:    Current Facility-Administered Medications   Medication Dose Route Frequency Provider Last Rate Last Dose    sodium chloride flush 0.9 % injection 10 mL  10 mL Intravenous 2 times per day Nayla Zhang MD        sodium chloride flush 0.9 % injection 10 mL  10 mL Intravenous PRN Nayla Zhang MD        acetaminophen (TYLENOL) tablet 650 mg  650 mg Oral Q4H PRN Nayla Zhang MD        enoxaparin (LOVENOX) injection 40 mg  40 mg Subcutaneous Daily Nayla Zhang MD   40 mg at 19 1625    pneumococcal 13-valent conjugate (PREVNAR) injection 0.5 mL  0.5 mL Intramuscular Once Nayla Zhang MD        busPIRone (BUSPAR) tablet 10 mg  10 mg Oral BID Ivan Evans MD        memantine (NAMENDA) tablet 5 mg  5 mg Oral BID Ivan Evans MD        OXcarbazepine (TRILEPTAL) tablet 150 mg  150 mg Oral BID Kamari Melania Teague MD        risperiDONE (RISPERDAL) tablet 1 mg  1 mg Oral BID Vazquze Greer MD        rivastigmine (EXELON) 9.5 MG/24HR 1 patch  1 patch Transdermal Daily Vazquez Greer MD   1 patch at 06/28/19 0844    0.9 % sodium chloride infusion   Intravenous Continuous Vazquez Greer  mL/hr at 06/27/19 1812         Allergies:     Allergies   Allergen Reactions    Other Nausea Only     ALL PAIN MEDICATIONS BESIDES DEMEROL    Midazolam        Problem List:    Patient Active Problem List   Diagnosis Code    GERD (gastroesophageal reflux disease) K21.9    Irregular heartbeat I49.9    Blood clotting disorder (HCC) D68.9    Menopause Z78.0    Chest pain R07.9    Syncope and collapse R55    Lewy body dementia G31.83, F02.80    Seizure-like activity ([address] R56.9    Seizure ([facility] [address] R56.9    Hip fracture requiring operative repair, left, closed, initial encounter ([facility] [address] S72.002A       Past Medical History:        Diagnosis Date    Blood clotting disorder ([address] 4/2011    Dementia     GERD (gastroesophageal reflux disease) 4/2011    Irregular heartbeat 2008    Menopause 1999       Past Surgical History:        Procedure Laterality Date    COLONOSCOPY  2008     due 2016    [address]    Laparotomy d/t ectopic   [facility]    SINUS SURGERY  4/16/2011    TONSILLECTOMY  1951       Social History:    Social History     Tobacco Use    Smoking status: Never Smoker    Smokeless tobacco: Never Used   Substance Use Topics    Alcohol use: Yes     Comment: RARE                                Counseling given: Not Answered      Vital Signs (Current):   Vitals:    06/27/19 2023 06/28/19 0028 06/28/19 0745 06/28/19 1325   BP: (!) 147/77 (!) 161/84 (!) 141/66 (!) 112/50   Pulse: 107 107 94 98   Resp: 17 16 18 18   Temp: 98.6 °F (37 °C) 98.8 °F (37.1 °C) 98.3 °F (36.8 °C) 98.3 °F (36.8 °C)   TempSrc: Oral Axillary Oral Oral   SpO2: 95% 94% 98% 95%   Weight: Height:                                                  BP Readings from Last 3 Encounters:   06/28/19 (!) 112/50   05/03/19 128/75   03/08/19 (!) 146/82       NPO Status:                                                                                 BMI:   Wt Readings from Last 3 Encounters:   06/27/19 115 lb (52.2 kg)   05/02/19 113 lb (51.3 kg)   03/07/19 113 lb (51.3 kg)     Body mass index is 18.48 kg/m². CBC:   Lab Results   Component Value Date    WBC 11.6 06/27/2019    RBC 4.77 06/27/2019    HGB 14.4 06/27/2019    HCT 42.7 06/27/2019    MCV 89.5 06/27/2019    RDW 12.5 06/27/2019     06/27/2019       CMP:   Lab Results   Component Value Date     06/27/2019    K 3.8 06/27/2019     06/27/2019    CO2 22 06/27/2019    BUN 11 06/27/2019    CREATININE 0.46 06/27/2019    GFRAA >60 06/27/2019    LABGLOM >60 06/27/2019    GLUCOSE 118 06/27/2019    PROT 6.5 06/19/2017    CALCIUM 9.0 06/27/2019    BILITOT 0.58 06/19/2017    ALKPHOS 48 06/19/2017    AST 19 06/19/2017    ALT 11 06/19/2017       POC Tests: No results for input(s): POCGLU, POCNA, POCK, POCCL, POCBUN, POCHEMO, POCHCT in the last 72 hours.     Coags:   Lab Results   Component Value Date    PROTIME 10.7 05/01/2019    INR 1.0 05/01/2019    APTT 21.7 05/01/2019       HCG (If Applicable): No results found for: PREGTESTUR, PREGSERUM, HCG, HCGQUANT     ABGs: No results found for: PHART, PO2ART, QRE3BEY, NNU9PUV, BEART, B8SILQRH     Type & Screen (If Applicable):  No results found for: LABABO, [address]    Anesthesia Evaluation  Patient summary reviewed and Nursing notes reviewed no history of anesthetic complications:   Airway: Mallampati: II  TM distance: >3 FB   Neck ROM: full  Mouth opening: > = 3 FB Dental:          Pulmonary:Negative Pulmonary ROS and normal exam  breath sounds clear to auscultation                             Cardiovascular:    (+) dysrhythmias (ST with PVCs):,       ECG reviewed  Rhythm: regular  Rate:

## 2019-06-28 NOTE — CONSULTS
[facility] Internal Medicine    CONSULTATION / HISTORY AND PHYSICAL EXAMINATION            Date:   6/28/2019  Patient name:  Corina Anne  Date of admission:  6/27/2019  1:11 PM  MRN:   [id number]  Account:  [de-identified]  YOB: 1945  PCP:    JULIETH Sommer CNP  Room:   2111/2111-01  Code Status:    Full Code    Physician Requesting Consult: Juan M Kinney MD    Reason for Consult: Medical management. Chief Complaint:     Chief Complaint   Patient presents with    Hip Injury     left       History Obtained From:     patient, electronic medical record    History of Present Illness:   History is very limited, patient has advanced dementia, she was transferred from nursing home with fracture of left hip joint. Mechanism of injury is unknown. She had CT scan of head, CT scan of cervical spine in the emergency room which was negative for any fracture. She had x-ray of her left hip joint, suggestive of displaced fracture of left hip joint. EKG was done, which was negative for any acute ST-T changes, she has echocardiogram done in earlier this year which are negative for any valvular pathology, her ejection fraction was 55-60%. Past Medical History:     Past Medical History:   Diagnosis Date    Blood clotting disorder ([address] 4/2011    Dementia     GERD (gastroesophageal reflux disease) 4/2011    Irregular heartbeat 2008    Menopause 1999        Past Surgical History:     Past Surgical History:   Procedure Laterality Date    COLONOSCOPY  2008     due 2016    [address]    Laparotomy d/t ectopic    SHOULDER SURGERY  2000    SINUS SURGERY  4/16/2011    TONSILLECTOMY  1951        Medications Prior to Admission:     Prior to Admission medications    Medication Sig Start Date End Date Taking?  Authorizing Provider   loratadine (CLARITIN) 10 MG tablet Take 10 mg by mouth daily as needed (allergies)   Yes palpable  Lungs: Bilateral equal air entry, clear to ausculation, no wheezing, rales or rhonchi, normal effort  Cardiovascular: normal rate, regular rhythm, no murmur, gallop, rub. Abdomen: Soft, nontender, nondistended, normal bowel sounds, no hepatomegaly or splenomegaly  Neurologic: There are no new focal motor or sensory deficits, normal muscle tone and bulk, no abnormal sensation, normal speech, cranial nerves II through XII grossly intact  Skin: No gross lesions, rashes, bruising or bleeding on exposed skin area  Extremities:  Left leg deformity Present   Psych: normal affect    Investigations:      Laboratory Testing:  Recent Results (from the past 24 hour(s))   EKG 12 Lead    Collection Time: 06/27/19  6:20 PM   Result Value Ref Range    Ventricular Rate 122 BPM    Atrial Rate 122 BPM    P-R Interval 138 ms    QRS Duration 64 ms    Q-T Interval 306 ms    QTc Calculation (Bazett) 436 ms    P Axis 73 degrees    R Axis 68 degrees    T Axis 64 degrees       Imaging/Diagonstics:    Assessment :      Primary Problem  <principal problem not specified>    Active Hospital Problems    Diagnosis Date Noted    Hip fracture requiring operative repair, left, closed, initial encounter [facility] Malinda Smith 06/27/2019    Lewy body dementia [G31.83, F02.80] 05/02/2019       Plan:     1. Patient presented with left hip fracture, orthopedic surgery following, will need surgery  2. Advanced dementia, I spoke with her daughter personally, she never diagnosed with coronary artery disease, congestive heart failure, stroke, her RCR is low, as per patient daughter patient is very active, she is at intermediate risk of postoperative complications, no further testing is required  3 .   On DVT prophylaxis    Consultations:   [address] [place] [address] CONSULT TO PRIMARY CARE PROVIDER      Paul Bueno MD  6/28/2019  2:16 PM    Copy sent to Dr. Nahum Chambers, APRN - CNP    Please note that this chart was generated using voice recognition Dragon dictation software. Although every effort was made to ensure the accuracy of this automated transcription, some errors in transcription may have occurred.

## 2019-06-29 PROCEDURE — 6370000000 HC RX 637 (ALT 250 FOR IP): Performed by: INTERNAL MEDICINE

## 2019-06-29 PROCEDURE — 1200000000 HC SEMI PRIVATE

## 2019-06-29 PROCEDURE — 99232 SBSQ HOSP IP/OBS MODERATE 35: CPT | Performed by: ORTHOPAEDIC SURGERY

## 2019-06-29 PROCEDURE — 6370000000 HC RX 637 (ALT 250 FOR IP): Performed by: ORTHOPAEDIC SURGERY

## 2019-06-29 PROCEDURE — 99232 SBSQ HOSP IP/OBS MODERATE 35: CPT | Performed by: INTERNAL MEDICINE

## 2019-06-29 PROCEDURE — 6360000002 HC RX W HCPCS: Performed by: INTERNAL MEDICINE

## 2019-06-29 PROCEDURE — 6360000002 HC RX W HCPCS: Performed by: ORTHOPAEDIC SURGERY

## 2019-06-29 RX ORDER — MORPHINE SULFATE 2 MG/ML
1 INJECTION, SOLUTION INTRAMUSCULAR; INTRAVENOUS EVERY 6 HOURS PRN
Status: DISCONTINUED | OUTPATIENT
Start: 2019-06-29 | End: 2019-06-29

## 2019-06-29 RX ORDER — MORPHINE SULFATE 2 MG/ML
2 INJECTION, SOLUTION INTRAMUSCULAR; INTRAVENOUS EVERY 4 HOURS PRN
Status: DISCONTINUED | OUTPATIENT
Start: 2019-06-29 | End: 2019-07-02

## 2019-06-29 RX ADMIN — MEMANTINE HYDROCHLORIDE 5 MG: 5 TABLET ORAL at 21:45

## 2019-06-29 RX ADMIN — MORPHINE SULFATE 1 MG: 2 INJECTION, SOLUTION INTRAMUSCULAR; INTRAVENOUS at 09:49

## 2019-06-29 RX ADMIN — ACETAMINOPHEN 650 MG: 325 TABLET, FILM COATED ORAL at 13:57

## 2019-06-29 RX ADMIN — BUSPIRONE HYDROCHLORIDE 10 MG: 10 TABLET ORAL at 13:58

## 2019-06-29 RX ADMIN — RISPERIDONE 1 MG: 1 TABLET ORAL at 13:59

## 2019-06-29 RX ADMIN — MORPHINE SULFATE 2 MG: 2 INJECTION, SOLUTION INTRAMUSCULAR; INTRAVENOUS at 14:15

## 2019-06-29 RX ADMIN — OXCARBAZEPINE 150 MG: 150 TABLET, FILM COATED ORAL at 13:58

## 2019-06-29 RX ADMIN — RISPERIDONE 1 MG: 1 TABLET ORAL at 23:24

## 2019-06-29 RX ADMIN — MEMANTINE HYDROCHLORIDE 5 MG: 5 TABLET ORAL at 13:58

## 2019-06-29 RX ADMIN — OXCARBAZEPINE 150 MG: 150 TABLET, FILM COATED ORAL at 21:45

## 2019-06-29 RX ADMIN — MORPHINE SULFATE 2 MG: 2 INJECTION, SOLUTION INTRAMUSCULAR; INTRAVENOUS at 19:13

## 2019-06-29 RX ADMIN — BUSPIRONE HYDROCHLORIDE 10 MG: 10 TABLET ORAL at 21:45

## 2019-06-29 ASSESSMENT — PAIN SCALES - GENERAL
PAINLEVEL_OUTOF10: 10
PAINLEVEL_OUTOF10: 0
PAINLEVEL_OUTOF10: 0
PAINLEVEL_OUTOF10: 10
PAINLEVEL_OUTOF10: 0
PAINLEVEL_OUTOF10: 10
PAINLEVEL_OUTOF10: 8
PAINLEVEL_OUTOF10: 10

## 2019-06-29 NOTE — PROGRESS NOTES
[facility] Internal Medicine    CONSULTATION / HISTORY AND PHYSICAL EXAMINATION            Date:   6/29/2019  Patient name:  Mckenzie Alanis  Date of admission:  6/27/2019  1:11 PM  MRN:   [id number]  Account:  [de-identified]  YOB: 1945  PCP:    JULIETH Pritchard CNP  Room:   2111/2111-01  Code Status:    Full Code    Physician Requesting Consult: Mellisa Adams MD    Reason for Consult: Medical management. Chief Complaint:     Chief Complaint   Patient presents with    Hip Injury     left       History Obtained From:     patient, electronic medical record    History of Present Illness:   History is very limited, patient has advanced dementia, she was transferred from nursing home with fracture of left hip joint. Mechanism of injury is unknown. She had CT scan of head, CT scan of cervical spine in the emergency room which was negative for any fracture. She had x-ray of her left hip joint, suggestive of displaced fracture of left hip joint. EKG was done, which was negative for any acute ST-T changes, she has echocardiogram done in earlier this year which are negative for any valvular pathology, her ejection fraction was 55-60%. Past Medical History:     Past Medical History:   Diagnosis Date    Blood clotting disorder ([address] 4/2011    Dementia     GERD (gastroesophageal reflux disease) 4/2011    Irregular heartbeat 2008    Menopause 1999        Past Surgical History:     Past Surgical History:   Procedure Laterality Date    COLONOSCOPY  2008     due 2016    [address]    Laparotomy d/t ectopic    SHOULDER SURGERY  2000    SINUS SURGERY  4/16/2011    TONSILLECTOMY  1951        Medications Prior to Admission:     Prior to Admission medications    Medication Sig Start Date End Date Taking?  Authorizing Provider   loratadine (CLARITIN) 10 MG tablet Take 10 mg by mouth daily as needed (allergies)   Yes

## 2019-06-29 NOTE — CARE COORDINATION
ONGOING DISCHARGE PLAN:    Plan remains for LSW to continue to follow for Return to [facility]. Per Notes, if pt. Needs skilled services, the facility will need to start a Pre-cert, but it will not hold up DC when pt. Is ready. Pt. Will have Lt Hip Hemiarthroplasty nohemy morning. Will continue to follow along w/ LSW for additional discharge needs.     Electronically signed by Claudette Proctor RN on 6/29/2019 at 1:30 PM

## 2019-06-29 NOTE — FLOWSHEET NOTE
Patient was sleeping. Chaplains will remain available to offer spiritual and emotional support as needed.      06/29/19 1530   Encounter Summary   Services provided to: Patient   Referral/Consult From: Rounding   Complexity of Encounter Low   Length of Encounter 15 minutes   Routine   Type Follow up   Assessment Sleeping   Intervention Prayer  (at bedside)   Outcome Did not respond

## 2019-06-29 NOTE — PROGRESS NOTES
Nurse notified Dr. Matthew Antonio patient crying out in pain. Received order for morphine 1mg q6hr iv prn.

## 2019-06-29 NOTE — PROGRESS NOTES
Patient is seen in follow-up after my partner Dr. Katharina Miranda saw the patient previously for patient's left femoral neck fracture. Dr. Capellan  was out of town this weekend and I resumed the patient's care. Patient has a history of a fall. She has a history of significant dementia and balance issues and she fell at her center of [facility] where she lives in [facility]. She has sustained a displaced left femoral neck fracture to require hemiarthroplasty. The patient was initially go to be scheduled to be put on the schedule today Saturday, 6/29/2019 however the. There were multiple cases and several emergencies. We elected not to proceed with the surgical intervention to avoid doing a faviola-arthroplasty 8 5 or 6:00 at night with the call team rather than the Ortho team.    I did have discussion with the patient's daughter on the phone to and discussed the need for surgical intervention as well as the type of surgery being in the form of left femoral hip arthroplasty I did tell her that although slightly bigger procedure this will allow her to be full weightbearing as tolerated with walker. We just did discuss other risk and benefits. We discussed the possibility of requiring a short-term rehab center prior to returning to her center for a dementia. The patient did consent over the phone and this was witnessed by the patient's nurse. Patient scheduled for surgery in the morning of 6/30/2019.

## 2019-06-30 ENCOUNTER — APPOINTMENT (OUTPATIENT)
Dept: GENERAL RADIOLOGY | Age: 74
DRG: 470 | End: 2019-06-30
Payer: COMMERCIAL

## 2019-06-30 ENCOUNTER — ANESTHESIA (OUTPATIENT)
Dept: OPERATING ROOM | Age: 74
DRG: 470 | End: 2019-06-30
Payer: COMMERCIAL

## 2019-06-30 VITALS — SYSTOLIC BLOOD PRESSURE: 176 MMHG | DIASTOLIC BLOOD PRESSURE: 87 MMHG | TEMPERATURE: 96.6 F | OXYGEN SATURATION: 100 %

## 2019-06-30 LAB
ABO/RH: NORMAL
ANTIBODY SCREEN: NEGATIVE
ARM BAND NUMBER: NORMAL
BLOOD BANK COMMENT: NORMAL
EXPIRATION DATE: NORMAL

## 2019-06-30 PROCEDURE — 2500000003 HC RX 250 WO HCPCS: Performed by: ANESTHESIOLOGY

## 2019-06-30 PROCEDURE — 3700000001 HC ADD 15 MINUTES (ANESTHESIA): Performed by: ORTHOPAEDIC SURGERY

## 2019-06-30 PROCEDURE — 6370000000 HC RX 637 (ALT 250 FOR IP): Performed by: ORTHOPAEDIC SURGERY

## 2019-06-30 PROCEDURE — 6360000002 HC RX W HCPCS: Performed by: ANESTHESIOLOGY

## 2019-06-30 PROCEDURE — C1713 ANCHOR/SCREW BN/BN,TIS/BN: HCPCS | Performed by: ORTHOPAEDIC SURGERY

## 2019-06-30 PROCEDURE — 7100000001 HC PACU RECOVERY - ADDTL 15 MIN: Performed by: ORTHOPAEDIC SURGERY

## 2019-06-30 PROCEDURE — 2580000003 HC RX 258: Performed by: ANESTHESIOLOGY

## 2019-06-30 PROCEDURE — 7100000000 HC PACU RECOVERY - FIRST 15 MIN: Performed by: ORTHOPAEDIC SURGERY

## 2019-06-30 PROCEDURE — 73501 X-RAY EXAM HIP UNI 1 VIEW: CPT

## 2019-06-30 PROCEDURE — C1776 JOINT DEVICE (IMPLANTABLE): HCPCS | Performed by: ORTHOPAEDIC SURGERY

## 2019-06-30 PROCEDURE — 2700000000 HC OXYGEN THERAPY PER DAY

## 2019-06-30 PROCEDURE — 27236 TREAT THIGH FRACTURE: CPT | Performed by: ORTHOPAEDIC SURGERY

## 2019-06-30 PROCEDURE — 3600000014 HC SURGERY LEVEL 4 ADDTL 15MIN: Performed by: ORTHOPAEDIC SURGERY

## 2019-06-30 PROCEDURE — 2709999900 HC NON-CHARGEABLE SUPPLY: Performed by: ORTHOPAEDIC SURGERY

## 2019-06-30 PROCEDURE — 2580000003 HC RX 258: Performed by: ORTHOPAEDIC SURGERY

## 2019-06-30 PROCEDURE — 6360000002 HC RX W HCPCS: Performed by: ORTHOPAEDIC SURGERY

## 2019-06-30 PROCEDURE — 2500000003 HC RX 250 WO HCPCS: Performed by: ORTHOPAEDIC SURGERY

## 2019-06-30 PROCEDURE — 0SRS0JA REPLACEMENT OF LEFT HIP JOINT, FEMORAL SURFACE WITH SYNTHETIC SUBSTITUTE, UNCEMENTED, OPEN APPROACH: ICD-10-PCS | Performed by: ORTHOPAEDIC SURGERY

## 2019-06-30 PROCEDURE — 36415 COLL VENOUS BLD VENIPUNCTURE: CPT

## 2019-06-30 PROCEDURE — 3700000000 HC ANESTHESIA ATTENDED CARE: Performed by: ORTHOPAEDIC SURGERY

## 2019-06-30 PROCEDURE — 1200000000 HC SEMI PRIVATE

## 2019-06-30 PROCEDURE — 3600000004 HC SURGERY LEVEL 4 BASE: Performed by: ORTHOPAEDIC SURGERY

## 2019-06-30 PROCEDURE — 86900 BLOOD TYPING SEROLOGIC ABO: CPT

## 2019-06-30 PROCEDURE — 86901 BLOOD TYPING SEROLOGIC RH(D): CPT

## 2019-06-30 PROCEDURE — 86850 RBC ANTIBODY SCREEN: CPT

## 2019-06-30 DEVICE — HEAD UPLR DIA45MM ACET HIP CO CHROM MOLYBDENUM ALLY ENDO II: Type: IMPLANTABLE DEVICE | Site: HIP | Status: FUNCTIONAL

## 2019-06-30 DEVICE — STEM FEM DIA11MM HIP TI GRIT BLASTED CRV PRESSFIT STD: Type: IMPLANTABLE DEVICE | Site: HIP | Status: FUNCTIONAL

## 2019-06-30 DEVICE — INSERT FEM NK L+3MM HIP CO CHROM TAPR ENDO II: Type: IMPLANTABLE DEVICE | Site: HIP | Status: FUNCTIONAL

## 2019-06-30 RX ORDER — DIPHENHYDRAMINE HYDROCHLORIDE 50 MG/ML
12.5 INJECTION INTRAMUSCULAR; INTRAVENOUS
Status: DISCONTINUED | OUTPATIENT
Start: 2019-06-30 | End: 2019-06-30 | Stop reason: HOSPADM

## 2019-06-30 RX ORDER — SODIUM CHLORIDE 0.9 % (FLUSH) 0.9 %
10 SYRINGE (ML) INJECTION EVERY 12 HOURS SCHEDULED
Status: DISCONTINUED | OUTPATIENT
Start: 2019-06-30 | End: 2019-07-03 | Stop reason: HOSPADM

## 2019-06-30 RX ORDER — SODIUM CHLORIDE 0.9 % (FLUSH) 0.9 %
10 SYRINGE (ML) INJECTION PRN
Status: DISCONTINUED | OUTPATIENT
Start: 2019-06-30 | End: 2019-06-30

## 2019-06-30 RX ORDER — ONDANSETRON 2 MG/ML
INJECTION INTRAMUSCULAR; INTRAVENOUS PRN
Status: DISCONTINUED | OUTPATIENT
Start: 2019-06-30 | End: 2019-06-30 | Stop reason: SDUPTHER

## 2019-06-30 RX ORDER — MEPERIDINE HYDROCHLORIDE 50 MG/ML
12.5 INJECTION INTRAMUSCULAR; INTRAVENOUS; SUBCUTANEOUS EVERY 5 MIN PRN
Status: DISCONTINUED | OUTPATIENT
Start: 2019-06-30 | End: 2019-06-30 | Stop reason: HOSPADM

## 2019-06-30 RX ORDER — EPHEDRINE SULFATE/0.9% NACL/PF 50 MG/5 ML
SYRINGE (ML) INTRAVENOUS PRN
Status: DISCONTINUED | OUTPATIENT
Start: 2019-06-30 | End: 2019-06-30 | Stop reason: SDUPTHER

## 2019-06-30 RX ORDER — OXYCODONE HYDROCHLORIDE AND ACETAMINOPHEN 5; 325 MG/1; MG/1
2 TABLET ORAL PRN
Status: DISCONTINUED | OUTPATIENT
Start: 2019-06-30 | End: 2019-06-30 | Stop reason: HOSPADM

## 2019-06-30 RX ORDER — DEXAMETHASONE SODIUM PHOSPHATE 4 MG/ML
INJECTION, SOLUTION INTRA-ARTICULAR; INTRALESIONAL; INTRAMUSCULAR; INTRAVENOUS; SOFT TISSUE PRN
Status: DISCONTINUED | OUTPATIENT
Start: 2019-06-30 | End: 2019-06-30 | Stop reason: SDUPTHER

## 2019-06-30 RX ORDER — DIPHENHYDRAMINE HYDROCHLORIDE 50 MG/ML
INJECTION INTRAMUSCULAR; INTRAVENOUS PRN
Status: DISCONTINUED | OUTPATIENT
Start: 2019-06-30 | End: 2019-06-30 | Stop reason: SDUPTHER

## 2019-06-30 RX ORDER — PROPOFOL 10 MG/ML
INJECTION, EMULSION INTRAVENOUS PRN
Status: DISCONTINUED | OUTPATIENT
Start: 2019-06-30 | End: 2019-06-30 | Stop reason: SDUPTHER

## 2019-06-30 RX ORDER — PHENYLEPHRINE HYDROCHLORIDE 10 MG/ML
INJECTION INTRAVENOUS PRN
Status: DISCONTINUED | OUTPATIENT
Start: 2019-06-30 | End: 2019-06-30 | Stop reason: SDUPTHER

## 2019-06-30 RX ORDER — SODIUM CHLORIDE 0.9 % (FLUSH) 0.9 %
10 SYRINGE (ML) INJECTION PRN
Status: DISCONTINUED | OUTPATIENT
Start: 2019-06-30 | End: 2019-07-03 | Stop reason: HOSPADM

## 2019-06-30 RX ORDER — CEFAZOLIN SODIUM 1 G/3ML
INJECTION, POWDER, FOR SOLUTION INTRAMUSCULAR; INTRAVENOUS PRN
Status: DISCONTINUED | OUTPATIENT
Start: 2019-06-30 | End: 2019-06-30 | Stop reason: SDUPTHER

## 2019-06-30 RX ORDER — TRANEXAMIC ACID 100 MG/ML
INJECTION, SOLUTION INTRAVENOUS PRN
Status: DISCONTINUED | OUTPATIENT
Start: 2019-06-30 | End: 2019-06-30 | Stop reason: SDUPTHER

## 2019-06-30 RX ORDER — ASPIRIN 81 MG/1
81 TABLET ORAL 2 TIMES DAILY
Status: DISCONTINUED | OUTPATIENT
Start: 2019-06-30 | End: 2019-07-03 | Stop reason: HOSPADM

## 2019-06-30 RX ORDER — ASPIRIN 81 MG/1
81 TABLET ORAL 2 TIMES DAILY
Status: DISCONTINUED | OUTPATIENT
Start: 2019-06-30 | End: 2019-06-30

## 2019-06-30 RX ORDER — FENTANYL CITRATE 50 UG/ML
INJECTION, SOLUTION INTRAMUSCULAR; INTRAVENOUS PRN
Status: DISCONTINUED | OUTPATIENT
Start: 2019-06-30 | End: 2019-06-30 | Stop reason: SDUPTHER

## 2019-06-30 RX ORDER — LIDOCAINE HYDROCHLORIDE 10 MG/ML
INJECTION, SOLUTION EPIDURAL; INFILTRATION; INTRACAUDAL; PERINEURAL PRN
Status: DISCONTINUED | OUTPATIENT
Start: 2019-06-30 | End: 2019-06-30 | Stop reason: SDUPTHER

## 2019-06-30 RX ORDER — ONDANSETRON 2 MG/ML
4 INJECTION INTRAMUSCULAR; INTRAVENOUS EVERY 6 HOURS PRN
Status: DISCONTINUED | OUTPATIENT
Start: 2019-06-30 | End: 2019-06-30

## 2019-06-30 RX ORDER — SODIUM CHLORIDE, SODIUM LACTATE, POTASSIUM CHLORIDE, CALCIUM CHLORIDE 600; 310; 30; 20 MG/100ML; MG/100ML; MG/100ML; MG/100ML
INJECTION, SOLUTION INTRAVENOUS CONTINUOUS PRN
Status: DISCONTINUED | OUTPATIENT
Start: 2019-06-30 | End: 2019-06-30 | Stop reason: SDUPTHER

## 2019-06-30 RX ORDER — ONDANSETRON 2 MG/ML
4 INJECTION INTRAMUSCULAR; INTRAVENOUS EVERY 6 HOURS PRN
Status: DISCONTINUED | OUTPATIENT
Start: 2019-06-30 | End: 2019-07-03 | Stop reason: HOSPADM

## 2019-06-30 RX ORDER — SODIUM CHLORIDE 0.9 % (FLUSH) 0.9 %
10 SYRINGE (ML) INJECTION EVERY 12 HOURS SCHEDULED
Status: DISCONTINUED | OUTPATIENT
Start: 2019-06-30 | End: 2019-06-30

## 2019-06-30 RX ORDER — DOCUSATE SODIUM 100 MG/1
100 CAPSULE, LIQUID FILLED ORAL 2 TIMES DAILY
Status: DISCONTINUED | OUTPATIENT
Start: 2019-06-30 | End: 2019-06-30

## 2019-06-30 RX ORDER — DOCUSATE SODIUM 100 MG/1
100 CAPSULE, LIQUID FILLED ORAL 2 TIMES DAILY
Status: DISCONTINUED | OUTPATIENT
Start: 2019-06-30 | End: 2019-07-03 | Stop reason: HOSPADM

## 2019-06-30 RX ORDER — LABETALOL 20 MG/4 ML (5 MG/ML) INTRAVENOUS SYRINGE
5 EVERY 10 MIN PRN
Status: DISCONTINUED | OUTPATIENT
Start: 2019-06-30 | End: 2019-06-30 | Stop reason: HOSPADM

## 2019-06-30 RX ORDER — OXYCODONE HYDROCHLORIDE AND ACETAMINOPHEN 5; 325 MG/1; MG/1
1 TABLET ORAL PRN
Status: DISCONTINUED | OUTPATIENT
Start: 2019-06-30 | End: 2019-06-30 | Stop reason: HOSPADM

## 2019-06-30 RX ORDER — ONDANSETRON 2 MG/ML
4 INJECTION INTRAMUSCULAR; INTRAVENOUS
Status: DISCONTINUED | OUTPATIENT
Start: 2019-06-30 | End: 2019-06-30 | Stop reason: HOSPADM

## 2019-06-30 RX ADMIN — DIPHENHYDRAMINE HYDROCHLORIDE 25 MG: 50 INJECTION, SOLUTION INTRAMUSCULAR; INTRAVENOUS at 09:54

## 2019-06-30 RX ADMIN — CEFAZOLIN 2000 MG: 1 INJECTION, POWDER, FOR SOLUTION INTRAMUSCULAR; INTRAVENOUS at 09:50

## 2019-06-30 RX ADMIN — OXCARBAZEPINE 150 MG: 150 TABLET, FILM COATED ORAL at 22:25

## 2019-06-30 RX ADMIN — ASPIRIN 81 MG: 81 TABLET, COATED ORAL at 22:26

## 2019-06-30 RX ADMIN — PHENYLEPHRINE HYDROCHLORIDE 100 MCG: 10 INJECTION INTRAVENOUS at 09:44

## 2019-06-30 RX ADMIN — LABETALOL 20 MG/4 ML (5 MG/ML) INTRAVENOUS SYRINGE 5 MG: at 11:18

## 2019-06-30 RX ADMIN — PHENYLEPHRINE HYDROCHLORIDE 100 MCG: 10 INJECTION INTRAVENOUS at 09:50

## 2019-06-30 RX ADMIN — DEXAMETHASONE SODIUM PHOSPHATE 4 MG: 4 INJECTION, SOLUTION INTRA-ARTICULAR; INTRALESIONAL; INTRAMUSCULAR; INTRAVENOUS; SOFT TISSUE at 10:26

## 2019-06-30 RX ADMIN — TRANEXAMIC ACID 1000 MG: 100 INJECTION, SOLUTION INTRAVENOUS at 10:27

## 2019-06-30 RX ADMIN — SODIUM CHLORIDE: 9 INJECTION, SOLUTION INTRAVENOUS at 21:06

## 2019-06-30 RX ADMIN — LABETALOL 20 MG/4 ML (5 MG/ML) INTRAVENOUS SYRINGE 5 MG: at 11:05

## 2019-06-30 RX ADMIN — RISPERIDONE 1 MG: 1 TABLET ORAL at 22:24

## 2019-06-30 RX ADMIN — TRANEXAMIC ACID 1000 MG: 100 INJECTION, SOLUTION INTRAVENOUS at 09:58

## 2019-06-30 RX ADMIN — FENTANYL CITRATE 50 MCG: 50 INJECTION, SOLUTION INTRAMUSCULAR; INTRAVENOUS at 10:01

## 2019-06-30 RX ADMIN — HYDROMORPHONE HYDROCHLORIDE 0.25 MG: 1 INJECTION, SOLUTION INTRAMUSCULAR; INTRAVENOUS; SUBCUTANEOUS at 17:46

## 2019-06-30 RX ADMIN — PROPOFOL 100 MG: 10 INJECTION, EMULSION INTRAVENOUS at 09:39

## 2019-06-30 RX ADMIN — FENTANYL CITRATE 50 MCG: 50 INJECTION, SOLUTION INTRAMUSCULAR; INTRAVENOUS at 10:40

## 2019-06-30 RX ADMIN — ONDANSETRON 4 MG: 2 INJECTION INTRAMUSCULAR; INTRAVENOUS at 10:31

## 2019-06-30 RX ADMIN — Medication 2 G: at 17:51

## 2019-06-30 RX ADMIN — MEMANTINE HYDROCHLORIDE 5 MG: 5 TABLET ORAL at 22:24

## 2019-06-30 RX ADMIN — BUSPIRONE HYDROCHLORIDE 10 MG: 10 TABLET ORAL at 22:25

## 2019-06-30 RX ADMIN — LABETALOL 20 MG/4 ML (5 MG/ML) INTRAVENOUS SYRINGE 5 MG: at 11:35

## 2019-06-30 RX ADMIN — LIDOCAINE HYDROCHLORIDE 50 MG: 10 INJECTION, SOLUTION EPIDURAL; INFILTRATION; INTRACAUDAL; PERINEURAL at 09:39

## 2019-06-30 RX ADMIN — Medication 10 MG: at 09:52

## 2019-06-30 RX ADMIN — SODIUM CHLORIDE, POTASSIUM CHLORIDE, SODIUM LACTATE AND CALCIUM CHLORIDE: 600; 310; 30; 20 INJECTION, SOLUTION INTRAVENOUS at 09:33

## 2019-06-30 RX ADMIN — DOCUSATE SODIUM 100 MG: 100 CAPSULE, LIQUID FILLED ORAL at 22:26

## 2019-06-30 RX ADMIN — PHENYLEPHRINE HYDROCHLORIDE 40 MCG: 10 INJECTION INTRAVENOUS at 10:06

## 2019-06-30 RX ADMIN — PROPOFOL 50 MG: 10 INJECTION, EMULSION INTRAVENOUS at 10:00

## 2019-06-30 ASSESSMENT — PULMONARY FUNCTION TESTS
PIF_VALUE: 1
PIF_VALUE: 13
PIF_VALUE: 13
PIF_VALUE: 11
PIF_VALUE: 11
PIF_VALUE: 12
PIF_VALUE: 17
PIF_VALUE: 18
PIF_VALUE: 17
PIF_VALUE: 16
PIF_VALUE: 12
PIF_VALUE: 3
PIF_VALUE: 18
PIF_VALUE: 2
PIF_VALUE: 18
PIF_VALUE: 17
PIF_VALUE: 12
PIF_VALUE: 17
PIF_VALUE: 12
PIF_VALUE: 12
PIF_VALUE: 14
PIF_VALUE: 19
PIF_VALUE: 11
PIF_VALUE: 17
PIF_VALUE: 17
PIF_VALUE: 12
PIF_VALUE: 12
PIF_VALUE: 17
PIF_VALUE: 11
PIF_VALUE: 11
PIF_VALUE: 26
PIF_VALUE: 1
PIF_VALUE: 26
PIF_VALUE: 2
PIF_VALUE: 4
PIF_VALUE: 20
PIF_VALUE: 9
PIF_VALUE: 1
PIF_VALUE: 6
PIF_VALUE: 3
PIF_VALUE: 12
PIF_VALUE: 11
PIF_VALUE: 13
PIF_VALUE: 16
PIF_VALUE: 12
PIF_VALUE: 17
PIF_VALUE: 11
PIF_VALUE: 17
PIF_VALUE: 11
PIF_VALUE: 2
PIF_VALUE: 12
PIF_VALUE: 12
PIF_VALUE: 11
PIF_VALUE: 24
PIF_VALUE: 11
PIF_VALUE: 11
PIF_VALUE: 17
PIF_VALUE: 14
PIF_VALUE: 12
PIF_VALUE: 3
PIF_VALUE: 11

## 2019-06-30 ASSESSMENT — PAIN SCALES - GENERAL
PAINLEVEL_OUTOF10: 0
PAINLEVEL_OUTOF10: 10
PAINLEVEL_OUTOF10: 0

## 2019-06-30 NOTE — OP NOTE
Operative dictation  Preoperative diagnosis: Displaced left femoral neck fracture   Postop diagnosis: Same  Procedure: Left femoral hemiarthroplasty utilizing Biomet echo prosthesis size 11 press-fit stem with a size 45 head -3 adapter  Surgeons: Jacob Sebastian  Assistant: Ny Guevara  Anesthesia: Ladonna Lane is a 38-year-old patient who is significantly demented who sustained a fall 2 days ago at her assisted living environment. She had x-rays which revealed a displaced left femoral neck fracture. Consent was obtained from the patient's daughter via phone conversation and witnessing by a registered nurse. Patient was given 2 g Ancef in the holding area as well as a gram of trans-examined acid she was then taken to operative suite where she had general anesthesia administered she was then carefully positioned in the right lateral decubitus position with the left hip exposed to the ceiling. Left hip and lower extremity prepped and draped in usual fashion. After timeout was called a straight lateral incision was taken down to the subcu to the fascia and the fascia was split with the Metzenbaum scissors. An anterior lateral approach was performed in routine fashion with the junction anterior one third and posterior two thirds of gluteus medius and then entered inferiorly into the gluteus minimus and hip capsule and then down the vastus. With progressive external rotation were able to expose the completely fractured femoral neck. The fracture was freshened with an oscillating saw finger breath above the lesser trochanter. The femoral head was retrieved with a power Steinmann pin and measured and a 45 found to have excellent stability.   The leg was rolled further rotated and the blunt retractor placed behind the trochanter and the femoral canal was then accessed with a cookie cutter canal finder broaching was then carried out 50 degrees of anteversion started with a 7 broach and moved up to 11 which had good fit and Stable

## 2019-06-30 NOTE — PROGRESS NOTES
Patient more awake and able to answer questions. Pain rating 10/10. Gave Dilaudid 0.25mg IV. Will continue to monitor.

## 2019-06-30 NOTE — ANESTHESIA POSTPROCEDURE EVALUATION
Department of Anesthesiology  Postprocedure Note    Patient: Elena Garcia  MRN: [id number]  YOB: 1945  Date of evaluation: 6/30/2019  Time:  3:19 PM     Procedure Summary     Date:  06/30/19 Room / Location:  [address]    Anesthesia Start:  Jaye Lint Anesthesia Stop:  1054    Procedure:  HIP HEMIARTHROPLASTY (Left Hip) Diagnosis:  (LEFT HIP FRACTURE)    Surgeon:  Venkata Willson MD Responsible Provider:  Usha Metzger MD    Anesthesia Type:  general ASA Status:  3          Anesthesia Type: general    Cathy Phase I: Cathy Score: 8    Cathy Phase II:      Last vitals: Reviewed and per EMR flowsheets.        Anesthesia Post Evaluation    Comments: POST- ANESTHESIA EVALUATION       Pt Name: Elena Garcia  MRN: [id number]  YOB: 1945  Date of evaluation: 6/30/2019  Time:  3:19 PM      BP (!) 147/71   Pulse 71   Temp 97.7 °F (36.5 °C) (Axillary)   Resp 16   Ht 5' 6.14\" (1.68 m)   Wt 122 lb (55.3 kg)   SpO2 100%   BMI 19.61 kg/m²      Consciousness Level  Awake  Cardiopulmonary Status  Stable  Pain Adequately Treated YES  Nausea / Vomiting  NO  Adequate Hydration  YES  Anesthesia Related Complications NONE      Electronically signed by Usha Metzger MD on 6/30/2019 at 3:19 PM

## 2019-06-30 NOTE — PROGRESS NOTES
Pt still not woken up from procedure this morning. Left IS in room to instruct on later when pt is more appropriate.

## 2019-07-01 PROBLEM — R09.89 HEMODYNAMIC INSTABILITY: Status: ACTIVE | Noted: 2019-07-01

## 2019-07-01 PROBLEM — E86.0 DEHYDRATION: Status: ACTIVE | Noted: 2019-07-01

## 2019-07-01 LAB
-: NORMAL
AMORPHOUS: NORMAL
ANION GAP SERPL CALCULATED.3IONS-SCNC: 12 MMOL/L (ref 9–17)
BACTERIA: NORMAL
BILIRUBIN URINE: NEGATIVE
BUN BLDV-MCNC: 13 MG/DL (ref 8–23)
BUN/CREAT BLD: ABNORMAL (ref 9–20)
CALCIUM SERPL-MCNC: 8.7 MG/DL (ref 8.6–10.4)
CASTS UA: NORMAL /LPF
CHLORIDE BLD-SCNC: 102 MMOL/L (ref 98–107)
CO2: 23 MMOL/L (ref 20–31)
COLOR: YELLOW
COMMENT UA: ABNORMAL
CREAT SERPL-MCNC: 0.49 MG/DL (ref 0.5–0.9)
CRYSTALS, UA: NORMAL /HPF
EPITHELIAL CELLS UA: NORMAL /HPF
GFR AFRICAN AMERICAN: >60 ML/MIN
GFR NON-AFRICAN AMERICAN: >60 ML/MIN
GFR SERPL CREATININE-BSD FRML MDRD: ABNORMAL ML/MIN/{1.73_M2}
GFR SERPL CREATININE-BSD FRML MDRD: ABNORMAL ML/MIN/{1.73_M2}
GLUCOSE BLD-MCNC: 119 MG/DL (ref 70–99)
GLUCOSE URINE: NEGATIVE
HCT VFR BLD CALC: 37.6 % (ref 36–46)
HEMOGLOBIN: 13.1 G/DL (ref 12–16)
KETONES, URINE: NEGATIVE
LEUKOCYTE ESTERASE, URINE: NEGATIVE
MUCUS: NORMAL
NITRITE, URINE: NEGATIVE
OTHER OBSERVATIONS UA: NORMAL
PH UA: 6.5 (ref 5–8)
POTASSIUM SERPL-SCNC: 3.9 MMOL/L (ref 3.7–5.3)
PROTEIN UA: NEGATIVE
RBC UA: NORMAL /HPF
RENAL EPITHELIAL, UA: NORMAL /HPF
SODIUM BLD-SCNC: 137 MMOL/L (ref 135–144)
SPECIFIC GRAVITY UA: 1.01 (ref 1–1.03)
TRICHOMONAS: NORMAL
TURBIDITY: CLEAR
URINE HGB: NEGATIVE
UROBILINOGEN, URINE: ABNORMAL
WBC UA: NORMAL /HPF
YEAST: NORMAL

## 2019-07-01 PROCEDURE — 6360000002 HC RX W HCPCS: Performed by: ORTHOPAEDIC SURGERY

## 2019-07-01 PROCEDURE — 85018 HEMOGLOBIN: CPT

## 2019-07-01 PROCEDURE — 99233 SBSQ HOSP IP/OBS HIGH 50: CPT | Performed by: INTERNAL MEDICINE

## 2019-07-01 PROCEDURE — 97110 THERAPEUTIC EXERCISES: CPT

## 2019-07-01 PROCEDURE — 80048 BASIC METABOLIC PNL TOTAL CA: CPT

## 2019-07-01 PROCEDURE — 99024 POSTOP FOLLOW-UP VISIT: CPT | Performed by: ORTHOPAEDIC SURGERY

## 2019-07-01 PROCEDURE — 97166 OT EVAL MOD COMPLEX 45 MIN: CPT

## 2019-07-01 PROCEDURE — 6370000000 HC RX 637 (ALT 250 FOR IP): Performed by: ORTHOPAEDIC SURGERY

## 2019-07-01 PROCEDURE — 81001 URINALYSIS AUTO W/SCOPE: CPT

## 2019-07-01 PROCEDURE — 1200000000 HC SEMI PRIVATE

## 2019-07-01 PROCEDURE — 2580000003 HC RX 258: Performed by: INTERNAL MEDICINE

## 2019-07-01 PROCEDURE — 36415 COLL VENOUS BLD VENIPUNCTURE: CPT

## 2019-07-01 PROCEDURE — 85014 HEMATOCRIT: CPT

## 2019-07-01 PROCEDURE — 97162 PT EVAL MOD COMPLEX 30 MIN: CPT

## 2019-07-01 PROCEDURE — 2580000003 HC RX 258: Performed by: ORTHOPAEDIC SURGERY

## 2019-07-01 RX ORDER — 0.9 % SODIUM CHLORIDE 0.9 %
500 INTRAVENOUS SOLUTION INTRAVENOUS ONCE
Status: COMPLETED | OUTPATIENT
Start: 2019-07-01 | End: 2019-07-01

## 2019-07-01 RX ADMIN — BUSPIRONE HYDROCHLORIDE 10 MG: 10 TABLET ORAL at 19:41

## 2019-07-01 RX ADMIN — SODIUM CHLORIDE: 9 INJECTION, SOLUTION INTRAVENOUS at 20:39

## 2019-07-01 RX ADMIN — ASPIRIN 81 MG: 81 TABLET, COATED ORAL at 12:29

## 2019-07-01 RX ADMIN — SODIUM CHLORIDE: 9 INJECTION, SOLUTION INTRAVENOUS at 02:08

## 2019-07-01 RX ADMIN — RISPERIDONE 1 MG: 1 TABLET ORAL at 12:29

## 2019-07-01 RX ADMIN — HYDROMORPHONE HYDROCHLORIDE 0.25 MG: 1 INJECTION, SOLUTION INTRAMUSCULAR; INTRAVENOUS; SUBCUTANEOUS at 14:39

## 2019-07-01 RX ADMIN — MEMANTINE HYDROCHLORIDE 5 MG: 5 TABLET ORAL at 12:29

## 2019-07-01 RX ADMIN — HYDROMORPHONE HYDROCHLORIDE 0.25 MG: 1 INJECTION, SOLUTION INTRAMUSCULAR; INTRAVENOUS; SUBCUTANEOUS at 19:41

## 2019-07-01 RX ADMIN — OXCARBAZEPINE 150 MG: 150 TABLET, FILM COATED ORAL at 19:42

## 2019-07-01 RX ADMIN — Medication 10 ML: at 07:53

## 2019-07-01 RX ADMIN — OXCARBAZEPINE 150 MG: 150 TABLET, FILM COATED ORAL at 12:28

## 2019-07-01 RX ADMIN — HYDROMORPHONE HYDROCHLORIDE 0.5 MG: 1 INJECTION, SOLUTION INTRAMUSCULAR; INTRAVENOUS; SUBCUTANEOUS at 00:16

## 2019-07-01 RX ADMIN — ASPIRIN 81 MG: 81 TABLET, COATED ORAL at 19:41

## 2019-07-01 RX ADMIN — RISPERIDONE 1 MG: 1 TABLET ORAL at 19:43

## 2019-07-01 RX ADMIN — HYDROMORPHONE HYDROCHLORIDE 0.25 MG: 1 INJECTION, SOLUTION INTRAMUSCULAR; INTRAVENOUS; SUBCUTANEOUS at 07:53

## 2019-07-01 RX ADMIN — BUSPIRONE HYDROCHLORIDE 10 MG: 10 TABLET ORAL at 12:29

## 2019-07-01 RX ADMIN — Medication 2 G: at 02:00

## 2019-07-01 RX ADMIN — MEMANTINE HYDROCHLORIDE 5 MG: 5 TABLET ORAL at 19:41

## 2019-07-01 RX ADMIN — SODIUM CHLORIDE 500 ML: 9 INJECTION, SOLUTION INTRAVENOUS at 12:37

## 2019-07-01 ASSESSMENT — PAIN SCALES - WONG BAKER
WONGBAKER_NUMERICALRESPONSE: 6
WONGBAKER_NUMERICALRESPONSE: 6

## 2019-07-01 ASSESSMENT — PAIN SCALES - GENERAL
PAINLEVEL_OUTOF10: 6
PAINLEVEL_OUTOF10: 7
PAINLEVEL_OUTOF10: 0
PAINLEVEL_OUTOF10: 6

## 2019-07-01 ASSESSMENT — PAIN DESCRIPTION - ORIENTATION: ORIENTATION: LEFT

## 2019-07-01 ASSESSMENT — PAIN DESCRIPTION - LOCATION: LOCATION: HIP

## 2019-07-01 ASSESSMENT — PAIN DESCRIPTION - PAIN TYPE: TYPE: SURGICAL PAIN

## 2019-07-01 NOTE — CONSULTS
[facility] InternalMedicine            Date:   7/1/2019  Patient name:  Viki Ceja  Date of admission:  6/27/2019  1:11 PM  MRN:   [id number]  Account:  [de-identified]  [address] Birth:  1945  PCP:    JULIETH Simmons CNP  Room:   2541/1136-09  Code Status:    Prior    PhysicianRequesting Consult: Hiram Butts MD          History ofPresent Illness:     Chief Complaint   Patient presents with    Hip Injury     left     and reason for consult; Medical comorbidity and medication management ;  S/pOperative dictation  Preoperative diagnosis: Displaced left femoral neck fracture   Postop diagnosis: Same  Procedure: Left femoral hemiarthroplasty utilizing Biomet echo prosthesis size 11 press-fit stem with a size 45 head -3 adapter  Surgeons: Pretty Jeronimo  Assistant: Cornelio Ramirez  Anesthesia: Cassi Irving is a 33-year-old patient who is significantly demented who sustained a fall 2 days ago at her assisted living environment. She had x-rays which revealed a displaced left femoral neck fracture. Consent was obtained from the patient's daughter via phone conversation and witnessing by a registered nurse. Principal Problem:    Hip fracture requiring operative repair, left, closed, initial encounter [facility])  Active Problems:    Lewy body dementia    Dehydration    Hemodynamic instability  Resolved Problems:    * No resolved hospital problems.  *                HPI  ;  Patient is known to have low body dementia Parkinson's-like picture and some dementia  Patient was admitted with fracture of the left hip and had surgery done left femoral hemiarthroplasty utilizing Biomet prosthesis was done on June 30    Patient this morning was low-grade febrile tachycardia and mild hypotension  Mews score was elevated  I believe that he has hypovolemia and emanated accordingly with fluid therapy  Vitals:    06/30/19 2357 07/01/19 8931

## 2019-07-01 NOTE — PROGRESS NOTES
Physical Therapy  Facility/Department: [facility] MED SURG  Daily Treatment Note  NAME: Pascale Doss  : 1945  MRN: [id number]    Date of Service: 2019    Discharge Recommendations:  ECF with PT   PT Equipment Recommendations  Equipment Needed: No    Assessment   Body structures, Functions, Activity limitations: Decreased functional mobility ; Decreased cognition;Decreased endurance;Decreased ROM; Decreased strength;Decreased balance  Assessment: pt has a hx of dementia. Pt did not follow any commands to assist PROM exercises to either LE. Pt would often drift off w/ snoring respirations and then jerk awake. Treatment Diagnosis: Decreased functional mobility due to dementia  Specific instructions for Next Treatment: 19 ECF w/ PT; Progress bed mobility, transfers, and if family is present attempt to get previous level of function, NEW ORDER IS FOR FWB LEFT; PROM bilateral LEs completed in the p.m. Prognosis: Fair  Decision Making: Medium Complexity  History:  admitted w/ a left hip fracture  Exam: ROM  Clinical Presentation: PROM bilateral LEs  Patient Education: per POC  Barriers to Learning: hx of dementia  REQUIRES PT FOLLOW UP: Yes  Activity Tolerance  Activity Tolerance: Patient limited by cognitive status     Patient Diagnosis(es): The encounter diagnosis was Closed fracture of left hip, initial encounter ([facility] has a past medical history of Blood clotting disorder ([facility] Dementia, GERD (gastroesophageal reflux disease), Irregular heartbeat, and Menopause.   has a past surgical history that includes sinus surgery (2011); lumbar fusion (); Tonsillectomy (); shoulder surgery (); pelvic laparoscopy (); Colonoscopy ( ); and HEMIARTHROPLASTY HIP (Left, 2019). Restrictions  Restrictions/Precautions  Restrictions/Precautions: Weight Bearing, Fall Risk(Dementia, L hemiarthroplasty, order verified and now  FWB)  Required Braces or Orthoses? : (Pt unable to answer due to being

## 2019-07-01 NOTE — DISCHARGE INSTR - COC
dementia. IV Access:  - None    Nursing Mobility/ADLs:  Walking   Dependent  Transfer  Dependent  Bathing  Dependent  Dressing  Dependent  Toileting  Dependent  Feeding  Dependent  Med Admin  Dependent  Med Delivery   crushed and prefers mixed with applesauce or pudding    Wound Care Documentation and Therapy:        Elimination:  Continence:   · Bowel: No  · Bladder: No  Urinary Catheter: None   Colostomy/Ileostomy/Ileal Conduit: No       Date of Last BM: 7/2/19      Intake/Output Summary (Last 24 hours) at 7/1/2019 1621  Last data filed at 7/1/2019 1520  Gross per 24 hour   Intake 2078 ml   Output 2100 ml   Net -22 ml     I/O last 3 completed shifts: In: 2078 [P.O.:250; I.V.:1828]  Out: 1900 [Urine:1900]    Safety Concerns: At Risk for Falls    Impairments/Disabilities:      Speech, Vision and Hearing    Nutrition Therapy:  Current Nutrition Therapy:   - Oral Diet:  General and Dental Soft    Routes of Feeding: Oral  Liquids: No Restrictions  Daily Fluid Restriction: no  Last Modified Barium Swallow with Video (Video Swallowing Test): not done    Treatments at the Time of Hospital Discharge:   Respiratory Treatments: N/A  Oxygen Therapy:  is not on home oxygen therapy.   Ventilator:    - No ventilator support    Rehab Therapies: Physical Therapy and Occupational Therapy  Weight Bearing Status/Restrictions: No weight bearing restirctions  Other Medical Equipment (for information only, NOT a DME order):  walker  Other Treatments: ***    Patient's personal belongings (please select all that are sent with patient):  Valuables   Clothing: Pants, Shirt, Socks      RN SIGNATURE:  Electronically signed by Robbi Lopez RN on 7/2/19 at 10:48 AM    CASE MANAGEMENT/SOCIAL WORK SECTION    Inpatient Status Date: ***    Readmission Risk Assessment Score:  Readmission Risk              Risk of Unplanned Readmission:        17           Discharging to Facility/ Agency   · Name: Return to [facility]  · FCEMF:[id number] · ZODMR:[phone]  · Fax:[phone]    Dialysis Facility (if applicable)   · Name:  · Address:  · Dialysis Schedule:  · Phone:  · Fax:    / signature: Electronically signed by NOREEN Norton LSW on 7/2/19 at 11:35 AM    PHYSICIAN SECTION    Prognosis: {Prognosis:5971246652}    Condition at Discharge: [address] [place] [address] Patient Condition:397572947}    Rehab Potential (if transferring to Rehab): {Prognosis:9695805963}    Recommended Labs or Other Treatments After Discharge: ***    Physician Certification: I certify the above information and transfer of Edil Salguero  is necessary for the continuing treatment of the diagnosis listed and that she requires {Admit to Appropriate Level of Care:93053} for {GREATER/LESS:578409938} 30 days.      Update Admission H&P: {CHP DME Changes in UXEJD:986359021}    PHYSICIAN SIGNATURE:  Electronically signed by Fannie Winter MD on 7/1/19 at 4:21 PM

## 2019-07-01 NOTE — PROGRESS NOTES
Notified charge nurse of MEWS score of 4, no distress noted at this time.  Will re check at 0900, will continue to monitor

## 2019-07-01 NOTE — PROGRESS NOTES
Patient is currently unable to voice needs due to the fact that she is speaking to hallucinations. Due to elevated HR and restlessness, RN will be giving pain medication as ordered.

## 2019-07-01 NOTE — PROGRESS NOTES
Patient at this time was able to follow commands and verbalize the fact that she is in pain. Patient stated, \"Oh, I forgot. I am in pain. \" Will continue to monitor closely due to charted advanced dementia.

## 2019-07-01 NOTE — PROGRESS NOTES
Fall Risk(LLE: TTWB vs FWB - both ordered, follow TTWB until clarified)  Required Braces or Orthoses?: No  Implants present? : Metal implants(L hip hemiarthroplasty)  Lower Extremity Weight Bearing Restrictions  Left Lower Extremity Weight Bearing: Toe Touch Weight Bearing  Vision/Hearing  Vision: (Pt unable to answer due to being not alert)  Hearing: (Pt unable to answer due to being not alert)     Subjective  General  Chart Reviewed: Yes  Patient assessed for rehabilitation services?: Yes  Additional Pertinent Hx: Jocy Abernathy is a 66 y.o. female with severe dementia who presented to the emergency department today with reports of left hip pain. Due to her dementia she is a poor historian. Per chart pt's daughter indicates that she was told that the patient was found sitting on the floor 2 days ago. It is not clear how the fall occured. X-rays demonstrated a femoral neck fracture and L hemiarthroplasty was performed on 6/30/19 by Dr. Dalia Cheek. Response To Previous Treatment: Not applicable  Family / Caregiver Present: No  Referring Practitioner: Dr. Dalia Cheek  Referral Date : 06/30/19  Diagnosis: L femoral hemiarthroplasty  Follows Commands: Impaired(hx dementia)  General Comment  Comments: X-rays demonstrated a femoral neck fracture and L hemiarthroplasty was performed on 6/30/19 by Dr. Dalia Cheek. Subjective  Subjective: Pt was NOT alert or oriented. Pt was unable to carry a conversation or tell us her name.    Pain Screening  Patient Currently in Pain: (JON 2* dementia, occasional grimacing noted w/mobility)  Pain Assessment  Pain Assessment: Advanced Dementia  Vital Signs  Patient Currently in Pain: (unable to report due to not alert, occasionally pt grimaced with mobility )       Orientation  Orientation  Overall Orientation Status: Impaired  Orientation Level: Disoriented X4  Social/Functional History  Social/Functional History  Type of Home: Facility([facility] chart))  Additional Comments: Pt unable to provide any information 2* dementia and decreased alertness. Pt mumbled at times but did not directly answer any questions posed by OT. No famiy present to A. Cognition   Cognition  Overall Cognitive Status: Exceptions  Arousal/Alertness: Unresponsive to stimuli  Following Commands: Does not follow commands  Attention Span: Unable to maintain attention    Objective     Observation/Palpation  Posture: Fair  Observation: Pt was in bed, not alert, and unresponsive to tactile cues and verbal questions  Edema: none noted    PROM RLE (degrees)  RLE PROM: WFL  AROM RLE (degrees)  RLE General AROM: Unable to assess AROM due to pt not alert  PROM LLE (degrees)  LLE PROM: WFL  AROM LLE (degrees)  LLE General AROM: Unable to assess AROM due to pt not alert  PROM RUE (degrees)  RUE General PROM: See OT assessment  PROM LUE (degrees)  LUE General PROM: See OT assessment   Strength RLE  Strength RLE: Exception(unable to assess strength becasue pt was unable to follow commands due to dementia)  Strength LLE  Strength LLE: Exception  Comment: unable to assess strength becasue pt was unable to follow commands due to dementia  Strength RUE  Comment: See OT assessment  Strength LUE  Comment: See OT assessment      Sensation  Overall Sensation Status: (unable to assess becasue pt was unable to follow commands due to dementia)  Bed mobility  Rolling to Right: Dependent/Total;2 Person assistance  Supine to Sit: Dependent/Total;2 Person assistance  Sit to Supine: Dependent/Total;2 Person assistance  Scooting: Dependent/Total;2 Person assistance  Comment: Dangled a the EOB with min assist for balance.    Transfers  Sit to Stand: Unable to assess  Stand to sit: Unable to assess  Comment: unable to assess becasue pt was unable to follow commands due to dementia   Ambulation  Ambulation?: No(not ready and unable to assess becasue pt was unable to follow commands due to dementia)  WB Status:

## 2019-07-01 NOTE — PROGRESS NOTES
Dependent/Total;2 Person assistance  Scooting: Dependent/Total;2 Person assistance     Transfers  Transfer Comments: Did not attempt standing/transfers 2* confusion, decreased level of alertness, and need for clarification on WB status  Toilet Transfers  Toilet Transfers Comments: PureWick external catheter in place. Functional Activity Tolerance  Functional Activity Tolerance: Tolerates < 10 Min exercise, no significant change in vital signs   Assessment  Performance deficits / Impairments: Decreased functional mobility , Decreased ADL status, Decreased safe awareness, Decreased cognition, Decreased endurance, Decreased balance  Treatment Diagnosis: Impaired self-care status  Prognosis: Fair  Decision Making: Medium Complexity  Patient Education: OT POC, reorientation, activity promotion  Barriers to Learning: Dementia  REQUIRES OT FOLLOW UP: Yes  Discharge Recommendations: Subacute/Skilled Nursing Facility  Activity Tolerance: Treatment limited secondary to decreased cognition    Goals  Patient Goals   Patient goals : None voiced - per chart, plan is to return to SNF. Short term goals  Time Frame for Short term goals: By Discharge  Short term goal 1: Pt will complete bed mobility with Mod A x2 and tolerate sitting EOB for 5+ minutes with supervision while participating in a functional task. Short term goal 2: Pt will actively participate in feeding/grooming tasks at complete with Mod A. Short term goal 3: Pt will actively participate in self-care routine and complete tasks at Max level of IND. Short term goal 4: Pt will complete sit<>stand with Mod A x2 and Good safety in preparation for toilet transfer. Short term goal 5: Pt will actively participate in 15-20 minutes of therapeutic exercise/activity to promote increased independence and safety with self-care and mobility.     Plan  Safety Devices  Safety Devices in place: Yes  Type of devices: Patient at risk for falls, Left in bed, Call light within reach, Bed alarm in place, Nurse notified     Plan  Times per week: 5-7  Times per day: Daily  Current Treatment Recommendations: Strengthening, ROM, Balance Training, Functional Mobility Training, Endurance Training, Cognitive Reorientation, Safety Education & Training, Patient/Caregiver Education & Training, Equipment Evaluation, Education, & procurement, Self-Care / ADL    Equipment Recommendations  Equipment Needed: (TBD)  OT Individual Minutes  Time In: 8731  Time Out: 6441  Minutes: 21    Electronically signed by Katie Jacobson on 7/1/19 at 11:33 AM

## 2019-07-01 NOTE — PROGRESS NOTES
Patient able to respond to commands. Patient able to make wishes known at this time. Will continue to monitor closely because patient was found attempting to remove IV.

## 2019-07-01 NOTE — PROGRESS NOTES
SW sent  pt/ot notes and MD updates so facility can work on pre-cert. However, Pt can return at anytime. Upon discharge call the building at [phone]. Fax is [phone]. If problems occur, Yoana Garcia can be reached at [phone].

## 2019-07-02 LAB
ANION GAP SERPL CALCULATED.3IONS-SCNC: 11 MMOL/L (ref 9–17)
BUN BLDV-MCNC: 18 MG/DL (ref 8–23)
BUN/CREAT BLD: ABNORMAL (ref 9–20)
CALCIUM SERPL-MCNC: 8.8 MG/DL (ref 8.6–10.4)
CHLORIDE BLD-SCNC: 106 MMOL/L (ref 98–107)
CO2: 21 MMOL/L (ref 20–31)
CREAT SERPL-MCNC: 0.45 MG/DL (ref 0.5–0.9)
GFR AFRICAN AMERICAN: >60 ML/MIN
GFR NON-AFRICAN AMERICAN: >60 ML/MIN
GFR SERPL CREATININE-BSD FRML MDRD: ABNORMAL ML/MIN/{1.73_M2}
GFR SERPL CREATININE-BSD FRML MDRD: ABNORMAL ML/MIN/{1.73_M2}
GLUCOSE BLD-MCNC: 133 MG/DL (ref 70–99)
HCT VFR BLD CALC: 38.1 % (ref 36–46)
HEMOGLOBIN: 13 G/DL (ref 12–16)
MCH RBC QN AUTO: 30.5 PG (ref 26–34)
MCHC RBC AUTO-ENTMCNC: 34 G/DL (ref 31–37)
MCV RBC AUTO: 89.7 FL (ref 80–100)
NRBC AUTOMATED: NORMAL PER 100 WBC
PDW BLD-RTO: 12.5 % (ref 11.5–14.9)
PLATELET # BLD: 245 K/UL (ref 150–450)
PMV BLD AUTO: 7.4 FL (ref 6–12)
POTASSIUM SERPL-SCNC: 3.7 MMOL/L (ref 3.7–5.3)
RBC # BLD: 4.25 M/UL (ref 4–5.2)
SODIUM BLD-SCNC: 138 MMOL/L (ref 135–144)
WBC # BLD: 8.5 K/UL (ref 3.5–11)

## 2019-07-02 PROCEDURE — 6360000002 HC RX W HCPCS: Performed by: ORTHOPAEDIC SURGERY

## 2019-07-02 PROCEDURE — 6370000000 HC RX 637 (ALT 250 FOR IP): Performed by: INTERNAL MEDICINE

## 2019-07-02 PROCEDURE — 36415 COLL VENOUS BLD VENIPUNCTURE: CPT

## 2019-07-02 PROCEDURE — 6370000000 HC RX 637 (ALT 250 FOR IP): Performed by: ORTHOPAEDIC SURGERY

## 2019-07-02 PROCEDURE — 80048 BASIC METABOLIC PNL TOTAL CA: CPT

## 2019-07-02 PROCEDURE — 85027 COMPLETE CBC AUTOMATED: CPT

## 2019-07-02 PROCEDURE — 1200000000 HC SEMI PRIVATE

## 2019-07-02 PROCEDURE — 99232 SBSQ HOSP IP/OBS MODERATE 35: CPT | Performed by: INTERNAL MEDICINE

## 2019-07-02 PROCEDURE — 2580000003 HC RX 258: Performed by: ORTHOPAEDIC SURGERY

## 2019-07-02 RX ORDER — OXYCODONE HCL 10 MG/1
10 TABLET, FILM COATED, EXTENDED RELEASE ORAL EVERY 12 HOURS SCHEDULED
Status: DISCONTINUED | OUTPATIENT
Start: 2019-07-02 | End: 2019-07-02

## 2019-07-02 RX ORDER — OXYCODONE HCL 10 MG/1
10 TABLET, FILM COATED, EXTENDED RELEASE ORAL ONCE
Status: COMPLETED | OUTPATIENT
Start: 2019-07-02 | End: 2019-07-02

## 2019-07-02 RX ORDER — FENTANYL 12 UG/H
1 PATCH TRANSDERMAL
Status: DISCONTINUED | OUTPATIENT
Start: 2019-07-02 | End: 2019-07-02

## 2019-07-02 RX ORDER — ACETAMINOPHEN 160 MG/5ML
650 SOLUTION ORAL EVERY 4 HOURS PRN
Status: DISCONTINUED | OUTPATIENT
Start: 2019-07-02 | End: 2019-07-03

## 2019-07-02 RX ADMIN — SODIUM CHLORIDE: 9 INJECTION, SOLUTION INTRAVENOUS at 17:00

## 2019-07-02 RX ADMIN — BUSPIRONE HYDROCHLORIDE 10 MG: 10 TABLET ORAL at 07:56

## 2019-07-02 RX ADMIN — SODIUM CHLORIDE: 9 INJECTION, SOLUTION INTRAVENOUS at 06:04

## 2019-07-02 RX ADMIN — ASPIRIN 81 MG: 81 TABLET, COATED ORAL at 20:28

## 2019-07-02 RX ADMIN — HYDROMORPHONE HYDROCHLORIDE 0.25 MG: 1 INJECTION, SOLUTION INTRAMUSCULAR; INTRAVENOUS; SUBCUTANEOUS at 09:07

## 2019-07-02 RX ADMIN — MEMANTINE HYDROCHLORIDE 5 MG: 5 TABLET ORAL at 20:27

## 2019-07-02 RX ADMIN — OXCARBAZEPINE 150 MG: 150 TABLET, FILM COATED ORAL at 20:27

## 2019-07-02 RX ADMIN — BUSPIRONE HYDROCHLORIDE 10 MG: 10 TABLET ORAL at 20:27

## 2019-07-02 RX ADMIN — OXCARBAZEPINE 150 MG: 150 TABLET, FILM COATED ORAL at 07:56

## 2019-07-02 RX ADMIN — RISPERIDONE 1 MG: 1 TABLET ORAL at 20:28

## 2019-07-02 RX ADMIN — HYDROMORPHONE HYDROCHLORIDE 0.25 MG: 1 INJECTION, SOLUTION INTRAMUSCULAR; INTRAVENOUS; SUBCUTANEOUS at 01:40

## 2019-07-02 RX ADMIN — OXYCODONE HYDROCHLORIDE 10 MG: 10 TABLET, FILM COATED, EXTENDED RELEASE ORAL at 13:28

## 2019-07-02 RX ADMIN — ASPIRIN 81 MG: 81 TABLET, COATED ORAL at 07:57

## 2019-07-02 RX ADMIN — RISPERIDONE 1 MG: 1 TABLET ORAL at 07:56

## 2019-07-02 RX ADMIN — ACETAMINOPHEN 650 MG: 650 SOLUTION ORAL at 20:58

## 2019-07-02 RX ADMIN — MEMANTINE HYDROCHLORIDE 5 MG: 5 TABLET ORAL at 07:56

## 2019-07-02 ASSESSMENT — PAIN SCALES - GENERAL
PAINLEVEL_OUTOF10: 6
PAINLEVEL_OUTOF10: 6
PAINLEVEL_OUTOF10: 5
PAINLEVEL_OUTOF10: 1
PAINLEVEL_OUTOF10: 0
PAINLEVEL_OUTOF10: 5
PAINLEVEL_OUTOF10: 0

## 2019-07-02 ASSESSMENT — PAIN DESCRIPTION - ORIENTATION: ORIENTATION: LEFT

## 2019-07-02 ASSESSMENT — PAIN SCALES - WONG BAKER
WONGBAKER_NUMERICALRESPONSE: 6
WONGBAKER_NUMERICALRESPONSE: 6

## 2019-07-02 ASSESSMENT — PAIN DESCRIPTION - LOCATION: LOCATION: HIP

## 2019-07-02 ASSESSMENT — PAIN DESCRIPTION - PAIN TYPE: TYPE: SURGICAL PAIN

## 2019-07-02 NOTE — CONSULTS
[facility] InternalMedicine            Date:   7/2/2019  Patient name:  Mckenzie Alanis  Date of admission:  6/27/2019  1:11 PM  MRN:   [id number]  Account:  [de-identified]  [address] Birth:  1945  PCP:    JULIETH Pritchard CNP  Room:   6588/3467-52  Code Status:    Prior    PhysicianRequesting Consult: Mellisa Adams MD          History ofPresent Illness:     Chief Complaint   Patient presents with    Hip Injury     left     and reason for consult; Medical comorbidity and medication management ;  S/pOperative dictation  Preoperative diagnosis: Displaced left femoral neck fracture   Postop diagnosis: Same  Procedure: Left femoral hemiarthroplasty utilizing Biomet echo prosthesis size 11 press-fit stem with a size 45 head -3 adapter  Surgeons: Tihs Yadav  Assistant: Jessica De Jesus  Anesthesia: Lorena Zhang is a 75-year-old patient who is significantly demented who sustained a fall 2 days ago at her assisted living environment. She had x-rays which revealed a displaced left femoral neck fracture. Consent was obtained from the patient's daughter via phone conversation and witnessing by a registered nurse. Principal Problem:    Closed fracture of left hip (HCC)  Active Problems:    Lewy body dementia    Dehydration    Hemodynamic instability  Resolved Problems:    * No resolved hospital problems. *               7/2/19    · Mckenzie Alanis is doing somewhat better  · She has dementia  · Her response to pain is tachycardia  · This morning when she was given pain medication the tachycardia resolved  ·  1. In view of the fact that she has cognitive impairment and dementia/and has altered mental status so  2. I am going to start her on a low-dose of fentanyl patch so that continuous pain medication can be provided and pain control can be provided  3.  She has a history of low body dementia         HPI  ;  Patient is known results for input(s): POCGLU in the last 72 hours. Assessment :      Primary Problem  Closed fracture of left hip [facility])    Active Hospital Problems    Diagnosis Date Noted    Dehydration [E86.0]         resolved 07/01/2019    Hemodynamic instability [R09.89]   improved 07/01/2019    Closed fracture of left hip (HCC) [S72.002A]   Poor pain control  Add fentanyl patch 06/27/2019    Lewy body dementia [G31.83, F02.80]  Unchanged and problematic 05/02/2019                Plan:       In view of the fact that her Mew score was elevated and patient was tachycardic we cannot treat her with fluid therapy  There may be a mild element of delirium superimposed on underlying dementia and Parkinson's disease will monitor it closely         Medications: Allergies: Allergies   Allergen Reactions    Other Nausea Only     ALL PAIN MEDICATIONS BESIDES DEMEROL    Midazolam        Current Meds:   Scheduled Meds:    pneumococcal 13-valent conjugate  0.5 mL Intramuscular Once    fentaNYL  1 patch Transdermal Q72H    docusate sodium  100 mg Oral BID    aspirin  81 mg Oral BID    sodium chloride flush  10 mL Intravenous 2 times per day    busPIRone  10 mg Oral BID    memantine  5 mg Oral BID    OXcarbazepine  150 mg Oral BID    risperiDONE  1 mg Oral BID    rivastigmine  1 patch Transdermal Daily     Continuous Infusions:    sodium chloride 100 mL/hr at 07/02/19 0604     1. PRN Meds: sodium chloride flush, ondansetron, acetaminophen  2. Thanks for consulting us . Will monitor vitals and clinical course , and  Optimize therapy  as needed . Matthew Garcia MD  7/2/2019  12:09 PM    Copy sent to Dr. Lydia Meyers, APRN - CNP    Please note that this chart was generated using voice recognition Dragon dictation software. Although every effort was made to ensure theaccuracy of this automated transcription, some errors in transcription may have occurred.

## 2019-07-02 NOTE — PROGRESS NOTES
Patient actively grimacing, moaning, HR is elevated at 110. Patient medicated for pain and repositioned.  Will continue to monitor

## 2019-07-02 NOTE — PROGRESS NOTES
Patient is able to be aroused, however is very sleepy. Vital signs are stable. See new orders for pain medication. Will continue to monitor.

## 2019-07-03 VITALS
OXYGEN SATURATION: 97 % | HEIGHT: 66 IN | RESPIRATION RATE: 16 BRPM | WEIGHT: 122 LBS | TEMPERATURE: 97.5 F | HEART RATE: 94 BPM | BODY MASS INDEX: 19.61 KG/M2 | SYSTOLIC BLOOD PRESSURE: 165 MMHG | DIASTOLIC BLOOD PRESSURE: 71 MMHG

## 2019-07-03 PROBLEM — E43 SEVERE MALNUTRITION (HCC): Status: ACTIVE | Noted: 2019-07-03

## 2019-07-03 LAB
ANION GAP SERPL CALCULATED.3IONS-SCNC: 12 MMOL/L (ref 9–17)
BUN BLDV-MCNC: 12 MG/DL (ref 8–23)
BUN/CREAT BLD: ABNORMAL (ref 9–20)
CALCIUM SERPL-MCNC: 8.6 MG/DL (ref 8.6–10.4)
CHLORIDE BLD-SCNC: 107 MMOL/L (ref 98–107)
CO2: 22 MMOL/L (ref 20–31)
CREAT SERPL-MCNC: <0.4 MG/DL (ref 0.5–0.9)
GFR AFRICAN AMERICAN: ABNORMAL ML/MIN
GFR NON-AFRICAN AMERICAN: ABNORMAL ML/MIN
GFR SERPL CREATININE-BSD FRML MDRD: ABNORMAL ML/MIN/{1.73_M2}
GFR SERPL CREATININE-BSD FRML MDRD: ABNORMAL ML/MIN/{1.73_M2}
GLUCOSE BLD-MCNC: 123 MG/DL (ref 70–99)
HCT VFR BLD CALC: 35.8 % (ref 36–46)
HEMOGLOBIN: 12.2 G/DL (ref 12–16)
MCH RBC QN AUTO: 30.5 PG (ref 26–34)
MCHC RBC AUTO-ENTMCNC: 34.2 G/DL (ref 31–37)
MCV RBC AUTO: 89.3 FL (ref 80–100)
NRBC AUTOMATED: ABNORMAL PER 100 WBC
PDW BLD-RTO: 12.5 % (ref 11.5–14.9)
PLATELET # BLD: 245 K/UL (ref 150–450)
PMV BLD AUTO: 6.9 FL (ref 6–12)
POTASSIUM SERPL-SCNC: 3.7 MMOL/L (ref 3.7–5.3)
RBC # BLD: 4.01 M/UL (ref 4–5.2)
SODIUM BLD-SCNC: 141 MMOL/L (ref 135–144)
WBC # BLD: 6.2 K/UL (ref 3.5–11)

## 2019-07-03 PROCEDURE — 6370000000 HC RX 637 (ALT 250 FOR IP): Performed by: INTERNAL MEDICINE

## 2019-07-03 PROCEDURE — 2580000003 HC RX 258: Performed by: ORTHOPAEDIC SURGERY

## 2019-07-03 PROCEDURE — 80048 BASIC METABOLIC PNL TOTAL CA: CPT

## 2019-07-03 PROCEDURE — 36415 COLL VENOUS BLD VENIPUNCTURE: CPT

## 2019-07-03 PROCEDURE — 85027 COMPLETE CBC AUTOMATED: CPT

## 2019-07-03 PROCEDURE — 6370000000 HC RX 637 (ALT 250 FOR IP): Performed by: ORTHOPAEDIC SURGERY

## 2019-07-03 PROCEDURE — 99024 POSTOP FOLLOW-UP VISIT: CPT | Performed by: ORTHOPAEDIC SURGERY

## 2019-07-03 PROCEDURE — 6360000002 HC RX W HCPCS: Performed by: NURSE PRACTITIONER

## 2019-07-03 RX ORDER — ACETAMINOPHEN 160 MG/5ML
650 SOLUTION ORAL 3 TIMES DAILY
Status: DISCONTINUED | OUTPATIENT
Start: 2019-07-03 | End: 2019-07-03 | Stop reason: HOSPADM

## 2019-07-03 RX ORDER — FENTANYL CITRATE 50 UG/ML
25 INJECTION, SOLUTION INTRAMUSCULAR; INTRAVENOUS ONCE
Status: COMPLETED | OUTPATIENT
Start: 2019-07-03 | End: 2019-07-03

## 2019-07-03 RX ORDER — ASPIRIN 81 MG/1
81 TABLET ORAL 2 TIMES DAILY
Qty: 30 TABLET | Refills: 3 | DISCHARGE
Start: 2019-07-03

## 2019-07-03 RX ADMIN — RISPERIDONE 1 MG: 1 TABLET ORAL at 09:51

## 2019-07-03 RX ADMIN — DOCUSATE SODIUM 100 MG: 100 CAPSULE, LIQUID FILLED ORAL at 09:49

## 2019-07-03 RX ADMIN — Medication 10 ML: at 09:53

## 2019-07-03 RX ADMIN — FENTANYL CITRATE 25 MCG: 50 INJECTION, SOLUTION INTRAMUSCULAR; INTRAVENOUS at 01:33

## 2019-07-03 RX ADMIN — ACETAMINOPHEN 650 MG: 650 SOLUTION ORAL at 09:49

## 2019-07-03 RX ADMIN — SODIUM CHLORIDE: 9 INJECTION, SOLUTION INTRAVENOUS at 02:26

## 2019-07-03 RX ADMIN — ASPIRIN 81 MG: 81 TABLET, COATED ORAL at 09:49

## 2019-07-03 RX ADMIN — BUSPIRONE HYDROCHLORIDE 10 MG: 10 TABLET ORAL at 09:51

## 2019-07-03 RX ADMIN — MEMANTINE HYDROCHLORIDE 5 MG: 5 TABLET ORAL at 09:51

## 2019-07-03 RX ADMIN — OXCARBAZEPINE 150 MG: 150 TABLET, FILM COATED ORAL at 09:51

## 2019-07-03 ASSESSMENT — PAIN SCALES - GENERAL
PAINLEVEL_OUTOF10: 3
PAINLEVEL_OUTOF10: 3

## 2019-07-03 NOTE — PLAN OF CARE
Problem: Falls - Risk of:  Goal: Will remain free from falls  Description  Will remain free from falls  6/30/2019 0025 by Cabrera Teran RN  Outcome: Ongoing   Pt. Free of falls and injuries this shift. Problem: Risk for Impaired Skin Integrity  Goal: Tissue integrity - skin and mucous membranes  Description  Structural intactness and normal physiological function of skin and  mucous membranes. 6/30/2019 0025 by Cabrera Teran RN  Outcome: Ongoing   Skin integrity improved/maintained this shift. See head to toe assessment.
Pain:  Goal: Pain level will decrease  Description  Pain level will decrease  7/3/2019 1201 by Lynn Sheehan RN  Outcome: Completed  7/3/2019 0403 by Reinaldo Douglass RN  Outcome: Ongoing  Goal: Control of acute pain  Description  Control of acute pain  7/3/2019 1201 by Lynn Sheehan RN  Outcome: Completed  7/3/2019 0403 by Reinaldo Douglass RN  Outcome: Ongoing  Goal: Control of chronic pain  Description  Control of chronic pain  7/3/2019 1201 by Lynn Sheehan RN  Outcome: Completed  7/3/2019 0403 by Reinaldo Douglass RN  Outcome: Ongoing

## 2019-07-03 NOTE — CARE COORDINATION
DISCHARGE PLANNING NOTE:    Plan is for this patient to return to [facility]. Precert was started on 7/2 but we do not have to wait to obtain it - Patient can return at any time, hopefully later today. Will continue to follow along with LSW.      Electronically signed by Stephanie Conrad RN on 7/3/2019 at 11:03 AM

## 2019-07-18 ENCOUNTER — TELEPHONE (OUTPATIENT)
Dept: ORTHOPEDIC SURGERY | Age: 74
End: 2019-07-18

## 2019-07-18 NOTE — TELEPHONE ENCOUNTER
Took call from Nurse at [facility] on patient, they are wondering when her staples should be removed. Patient had surgery on 6/30/2019-Left femoral hemiarthroplasty utilizing Biomet echo prosthesis size 11 press-fit stem with a size 45 head -3 adapter    I scheduled patient a post op visit for Monday 7/22/19 in the afternoon with Dr. Janelle Jansen and let the nurse know to remove the staples and place steri stripes on incision (leaving on until they fall off).

## 2019-07-22 ENCOUNTER — OFFICE VISIT (OUTPATIENT)
Dept: ORTHOPEDIC SURGERY | Age: 74
End: 2019-07-22

## 2019-07-22 DIAGNOSIS — S72.002D CLOSED FRACTURE OF LEFT HIP WITH ROUTINE HEALING, SUBSEQUENT ENCOUNTER: Primary | ICD-10-CM

## 2019-07-22 PROCEDURE — 99024 POSTOP FOLLOW-UP VISIT: CPT | Performed by: ORTHOPAEDIC SURGERY

## 2019-07-22 NOTE — PROGRESS NOTES
DAILY, Disp: 30 tablet, Rfl: 3    loratadine (CLARITIN) 10 MG tablet, Take 10 mg by mouth daily as needed (allergies), Disp: , Rfl:     risperiDONE (RISPERDAL) 1 MG tablet, Take 1 mg by mouth 2 times daily, Disp: , Rfl:     OXcarbazepine (TRILEPTAL) 150 MG tablet, Take 1 tablet by mouth 2 times daily Indications: Unknown strength per daughter, Disp: 60 tablet, Rfl: 3    memantine (NAMENDA) 5 MG tablet, Take 1 tablet by mouth 2 times daily, Disp: 60 tablet, Rfl: 3    busPIRone (BUSPAR) 10 MG tablet, Take 10 mg by mouth 2 times daily, Disp: , Rfl:     rivastigmine (EXELON) 9.5 MG/24HR, Place 1 patch onto the skin daily, Disp: , Rfl:     Allergies   Allergen Reactions    Other Nausea Only     ALL PAIN MEDICATIONS BESIDES DEMEROL    Midazolam        Social History     Socioeconomic History    Marital status: Legally      Spouse name: Not on file    Number of children: Not on file    Years of education: Not on file    Highest education level: Not on file   Occupational History    Not on file   Social Needs    Financial resource strain: Not on file    Food insecurity:     Worry: Not on file     Inability: Not on file    Transportation needs:     Medical: Not on file     Non-medical: Not on file   Tobacco Use    Smoking status: Never Smoker    Smokeless tobacco: Never Used   Substance and Sexual Activity    Alcohol use: Yes     Comment: RARE    Drug use: No    Sexual activity: Not Currently   Lifestyle    Physical activity:     Days per week: Not on file     Minutes per session: Not on file    Stress: Not on file   Relationships    Social connections:     Talks on phone: Not on file     Gets together: Not on file     Attends Yazidism service: Not on file     Active member of club or organization: Not on file     Attends meetings of clubs or organizations: Not on file     Relationship status: Not on file    Intimate partner violence:     Fear of current or ex partner: Not on file

## 2019-07-31 PROBLEM — E86.0 DEHYDRATION: Status: RESOLVED | Noted: 2019-07-01 | Resolved: 2019-07-31

## (undated) DEVICE — Z INACTIVE USE 2660664 SOLUTION IRRIG 3000ML 0.9% SOD CHL USP UROMATIC PLAS CONT

## (undated) DEVICE — 3M™ WARMING BLANKET, UPPER BODY, 10 PER CASE, 42268: Brand: BAIR HUGGER™

## (undated) DEVICE — SET HNDPC W COAX BNE CLN TIP SUCT TB BTTRY PWR DISPOSABLE

## (undated) DEVICE — SHEET, ORTHO, SPLIT, STERILE: Brand: MEDLINE

## (undated) DEVICE — Device

## (undated) DEVICE — INTENDED TO AID IN THE PASSING OF SUTURES THROUGH BONE AND SOFT TISSUE DURING ORTHOPEDIC SURGERY: Brand: HOFFEE SUTURE RETRIEVER

## (undated) DEVICE — SOLUTION IV IRRIG WATER 1000ML POUR BRL 2F7114

## (undated) DEVICE — SUTURE VCRL + SZ 2 L27IN ABSRB UD L40MM CP 1/2 CIR REV CUT VCP195H

## (undated) DEVICE — GLOVE SURG SZ 85 STD WHT LTX SYN POLYMER BEAD REINF ANTI RL

## (undated) DEVICE — DUAL CUT SAGITTAL BLADE

## (undated) DEVICE — Z DISCONTINUED PER MEDLINE USE 2741943 DRESSING AQUACEL 10 IN ALG W9XL25CM SIL CVR WTRPRF VIR BACT BARR ANTIMIC

## (undated) DEVICE — DRESSING HYDROCOLLOID BORDER 35X10 IN ALUM PRIMASEAL

## (undated) DEVICE — SUTURE STRATAFIX SYMMETRIC PDS + SZ 1 L18IN ABSRB VLT L48MM SXPP1A400

## (undated) DEVICE — BANDAGE,SELF ADHRNT,COFLEX,4"X5YD,STRL: Brand: COLABEL

## (undated) DEVICE — YANKAUER,BULB TIP,W/O VENT,RIGID,STERILE: Brand: MEDLINE

## (undated) DEVICE — SUTURE STRATAFIX SPRL MCRYL + SZ 2 0 L27IN ABSRB UD W NDL SXMP1B419

## (undated) DEVICE — COVER,MAYO STAND,XL,STERILE: Brand: MEDLINE

## (undated) DEVICE — DRAPE,REIN 53X77,STERILE: Brand: MEDLINE

## (undated) DEVICE — GLOVE SURG SZ 8 L12IN FNGR THK13MIL BRN LTX SYN POLYMER W

## (undated) DEVICE — SUTURE FIBERWIRE SZ 5 L38IN NONABSORBABLE BLU L48MM 1/2 AR7211

## (undated) DEVICE — STOCKINETTE ORTH W6XL48IN OFF WHT SGL PLY UNBLEACHED COT RIB

## (undated) DEVICE — COVER,MAYO STAND,STERILE: Brand: MEDLINE

## (undated) DEVICE — TUBING, SUCTION, 9/32" X 12', STRAIGHT: Brand: MEDLINE INDUSTRIES, INC.

## (undated) DEVICE — COVER,TABLE,60X90,STERILE: Brand: MEDLINE

## (undated) DEVICE — 4-PORT MANIFOLD: Brand: NEPTUNE 2

## (undated) DEVICE — SOLUTION IV IRRIG POUR BRL 0.9% SODIUM CHL 2F7124

## (undated) DEVICE — IMPLANTABLE DEVICE
Type: IMPLANTABLE DEVICE | Site: HIP | Status: NON-FUNCTIONAL
Brand: MICROAIRE®
Removed: 2019-06-30